# Patient Record
Sex: MALE | Race: WHITE | NOT HISPANIC OR LATINO | Employment: OTHER
[De-identification: names, ages, dates, MRNs, and addresses within clinical notes are randomized per-mention and may not be internally consistent; named-entity substitution may affect disease eponyms.]

---

## 2017-01-06 ENCOUNTER — HOSPITAL (OUTPATIENT)
Dept: OTHER | Age: 82
End: 2017-01-06
Attending: INTERNAL MEDICINE

## 2017-01-06 LAB — INR BLDC: 2.5

## 2017-02-01 ENCOUNTER — HOSPITAL (OUTPATIENT)
Dept: OTHER | Age: 82
End: 2017-02-01
Attending: INTERNAL MEDICINE

## 2017-02-03 LAB — INR BLDC: 2.3

## 2017-03-01 ENCOUNTER — HOSPITAL (OUTPATIENT)
Dept: OTHER | Age: 82
End: 2017-03-01
Attending: INTERNAL MEDICINE

## 2017-03-06 LAB — INR BLDC: 2.1

## 2017-03-20 LAB — INR BLDC: 2.6

## 2017-04-11 ENCOUNTER — HOSPITAL (OUTPATIENT)
Dept: OTHER | Age: 82
End: 2017-04-11
Attending: INTERNAL MEDICINE

## 2017-04-11 LAB — INR BLDC: 2.3

## 2017-04-29 LAB — INR BLDC: 1.6

## 2017-05-01 ENCOUNTER — HOSPITAL (OUTPATIENT)
Dept: OTHER | Age: 82
End: 2017-05-01
Attending: INTERNAL MEDICINE

## 2017-05-15 LAB — INR BLDC: 1.9

## 2017-05-27 ENCOUNTER — LAB ENCOUNTER (OUTPATIENT)
Dept: LAB | Age: 82
End: 2017-05-27
Attending: INTERNAL MEDICINE
Payer: MEDICARE

## 2017-05-27 DIAGNOSIS — E78.2 MIXED HYPERLIPIDEMIA: Primary | ICD-10-CM

## 2017-05-27 DIAGNOSIS — E11.42 DM TYPE 2 WITH DIABETIC PERIPHERAL NEUROPATHY (HCC): ICD-10-CM

## 2017-05-27 LAB — INR BLDC: 1.9

## 2017-05-27 PROCEDURE — 82043 UR ALBUMIN QUANTITATIVE: CPT | Performed by: INTERNAL MEDICINE

## 2017-05-27 PROCEDURE — 81003 URINALYSIS AUTO W/O SCOPE: CPT | Performed by: INTERNAL MEDICINE

## 2017-05-27 PROCEDURE — 82570 ASSAY OF URINE CREATININE: CPT | Performed by: INTERNAL MEDICINE

## 2017-05-27 PROCEDURE — 85025 COMPLETE CBC W/AUTO DIFF WBC: CPT

## 2017-06-01 ENCOUNTER — HOSPITAL (OUTPATIENT)
Dept: OTHER | Age: 82
End: 2017-06-01
Attending: INTERNAL MEDICINE

## 2017-06-08 PROBLEM — E53.8 VITAMIN B12 DEFICIENCY: Status: ACTIVE | Noted: 2017-06-08

## 2017-06-08 PROBLEM — E78.2 MIXED HYPERLIPIDEMIA: Status: ACTIVE | Noted: 2017-06-08

## 2017-06-08 PROBLEM — E11.42 PERIPHERAL SENSORY NEUROPATHY DUE TO TYPE 2 DIABETES MELLITUS (HCC): Status: ACTIVE | Noted: 2017-06-08

## 2017-06-08 PROBLEM — I10 ESSENTIAL HYPERTENSION: Status: ACTIVE | Noted: 2017-06-08

## 2017-06-13 LAB — INR BLDC: 3.5

## 2017-06-26 LAB — INR BLDC: 3.3

## 2017-07-10 ENCOUNTER — HOSPITAL (OUTPATIENT)
Dept: OTHER | Age: 82
End: 2017-07-10
Attending: INTERNAL MEDICINE

## 2017-07-10 LAB — INR BLDC: 2.2

## 2017-08-01 ENCOUNTER — HOSPITAL (OUTPATIENT)
Dept: OTHER | Age: 82
End: 2017-08-01
Attending: INTERNAL MEDICINE

## 2017-08-01 LAB — INR BLDC: 1.8

## 2017-08-15 LAB — INR BLDC: 2.3

## 2017-09-01 ENCOUNTER — HOSPITAL (OUTPATIENT)
Dept: OTHER | Age: 82
End: 2017-09-01
Attending: INTERNAL MEDICINE

## 2017-09-05 LAB — INR BLDC: 1.8

## 2017-09-18 LAB — INR BLDC: 1.8

## 2017-10-02 ENCOUNTER — HOSPITAL (OUTPATIENT)
Dept: OTHER | Age: 82
End: 2017-10-02
Attending: INTERNAL MEDICINE

## 2017-10-02 LAB — INR BLDC: 2

## 2017-10-30 LAB — INR BLDC: 1.9

## 2017-11-01 ENCOUNTER — HOSPITAL (OUTPATIENT)
Dept: OTHER | Age: 82
End: 2017-11-01
Attending: INTERNAL MEDICINE

## 2017-11-15 PROBLEM — Z79.01 MONITORING FOR LONG-TERM ANTICOAGULANT USE: Status: ACTIVE | Noted: 2017-11-15

## 2017-11-15 PROBLEM — Z51.81 MONITORING FOR LONG-TERM ANTICOAGULANT USE: Status: ACTIVE | Noted: 2017-11-15

## 2017-11-15 LAB — INR BLDC: 2.2

## 2017-12-01 ENCOUNTER — HOSPITAL (OUTPATIENT)
Dept: OTHER | Age: 82
End: 2017-12-01
Attending: INTERNAL MEDICINE

## 2017-12-06 LAB — INR BLDC: 2.6

## 2017-12-11 PROCEDURE — 82607 VITAMIN B-12: CPT | Performed by: INTERNAL MEDICINE

## 2018-01-04 ENCOUNTER — HOSPITAL (OUTPATIENT)
Dept: OTHER | Age: 83
End: 2018-01-04
Attending: INTERNAL MEDICINE

## 2018-01-04 LAB — INR BLDC: 2.6

## 2018-01-31 LAB — INR BLDC: 2.8

## 2018-02-01 ENCOUNTER — HOSPITAL (OUTPATIENT)
Dept: OTHER | Age: 83
End: 2018-02-01
Attending: INTERNAL MEDICINE

## 2018-02-28 LAB — INR BLDC: 2.8

## 2018-03-22 ENCOUNTER — HOSPITAL (OUTPATIENT)
Dept: OTHER | Age: 83
End: 2018-03-22
Attending: INTERNAL MEDICINE

## 2018-03-22 LAB — INR BLDC: 2.6

## 2018-04-01 ENCOUNTER — HOSPITAL (OUTPATIENT)
Dept: OTHER | Age: 83
End: 2018-04-01
Attending: INTERNAL MEDICINE

## 2018-04-19 LAB — INR BLDC: 2.6

## 2018-05-01 ENCOUNTER — HOSPITAL (OUTPATIENT)
Dept: OTHER | Age: 83
End: 2018-05-01
Attending: INTERNAL MEDICINE

## 2018-05-29 LAB — INR BLDC: 3

## 2018-06-19 ENCOUNTER — HOSPITAL (OUTPATIENT)
Dept: OTHER | Age: 83
End: 2018-06-19
Attending: INTERNAL MEDICINE

## 2018-06-19 LAB — INR BLDC: 2.8

## 2018-07-01 ENCOUNTER — HOSPITAL (OUTPATIENT)
Dept: OTHER | Age: 83
End: 2018-07-01
Attending: INTERNAL MEDICINE

## 2018-07-03 LAB — INR BLDC: 3.1

## 2018-07-05 PROBLEM — E11.22 CKD STAGE 3 DUE TO TYPE 2 DIABETES MELLITUS (HCC): Status: ACTIVE | Noted: 2018-07-05

## 2018-07-05 PROBLEM — N18.30 CKD STAGE 3 DUE TO TYPE 2 DIABETES MELLITUS (HCC): Status: ACTIVE | Noted: 2018-07-05

## 2018-07-05 PROBLEM — E55.9 VITAMIN D DEFICIENCY: Status: ACTIVE | Noted: 2018-07-05

## 2018-07-05 PROBLEM — E11.42 PERIPHERAL SENSORY NEUROPATHY DUE TO TYPE 2 DIABETES MELLITUS (HCC): Status: RESOLVED | Noted: 2017-06-08 | Resolved: 2018-07-05

## 2018-07-21 LAB — INR BLDC: 2.7

## 2018-09-25 ENCOUNTER — HOSPITAL (OUTPATIENT)
Dept: OTHER | Age: 83
End: 2018-09-25
Attending: INTERNAL MEDICINE

## 2018-09-25 LAB
INR BLDC: NORMAL
INR PPP: 3.4
PROTHROMBIN TIME: 32.3 SECONDS (ref 9.7–11.8)
PROTHROMBIN TIME: ABNORMAL

## 2018-12-28 PROCEDURE — 82570 ASSAY OF URINE CREATININE: CPT | Performed by: INTERNAL MEDICINE

## 2018-12-28 PROCEDURE — 82043 UR ALBUMIN QUANTITATIVE: CPT | Performed by: INTERNAL MEDICINE

## 2018-12-28 PROCEDURE — 81003 URINALYSIS AUTO W/O SCOPE: CPT | Performed by: INTERNAL MEDICINE

## 2019-01-07 PROBLEM — E78.00 PURE HYPERCHOLESTEROLEMIA: Status: ACTIVE | Noted: 2017-06-08

## 2020-01-13 PROBLEM — I73.9 PERIPHERAL VASCULAR DISEASE (HCC): Status: ACTIVE | Noted: 2020-01-13

## 2020-01-13 PROBLEM — F02.80 LATE ONSET ALZHEIMER'S DISEASE WITHOUT BEHAVIORAL DISTURBANCE (HCC): Status: ACTIVE | Noted: 2020-01-13

## 2020-01-13 PROBLEM — G30.1 LATE ONSET ALZHEIMER'S DISEASE WITHOUT BEHAVIORAL DISTURBANCE (HCC): Status: ACTIVE | Noted: 2020-01-13

## 2021-03-09 PROBLEM — E11.22 CKD STAGE 3 DUE TO TYPE 2 DIABETES MELLITUS (HCC): Status: ACTIVE | Noted: 2021-03-09

## 2021-03-09 PROBLEM — N18.30 CKD STAGE 3 DUE TO TYPE 2 DIABETES MELLITUS (HCC): Status: ACTIVE | Noted: 2021-03-09

## 2021-09-30 PROBLEM — E11.22 CKD STAGE 3 DUE TO TYPE 2 DIABETES MELLITUS (HCC): Status: RESOLVED | Noted: 2021-03-09 | Resolved: 2021-09-30

## 2021-09-30 PROBLEM — N18.30 CKD STAGE 3 DUE TO TYPE 2 DIABETES MELLITUS (HCC): Status: RESOLVED | Noted: 2021-03-09 | Resolved: 2021-09-30

## 2022-07-18 ENCOUNTER — APPOINTMENT (OUTPATIENT)
Dept: GENERAL RADIOLOGY | Facility: HOSPITAL | Age: 87
End: 2022-07-18
Attending: EMERGENCY MEDICINE
Payer: MEDICARE

## 2022-07-18 ENCOUNTER — HOSPITAL ENCOUNTER (EMERGENCY)
Facility: HOSPITAL | Age: 87
Discharge: HOME | End: 2022-07-18
Attending: EMERGENCY MEDICINE
Payer: MEDICARE

## 2022-07-18 ENCOUNTER — APPOINTMENT (OUTPATIENT)
Dept: CT IMAGING | Facility: HOSPITAL | Age: 87
End: 2022-07-18
Attending: EMERGENCY MEDICINE
Payer: MEDICARE

## 2022-07-18 VITALS
BODY MASS INDEX: 20.32 KG/M2 | TEMPERATURE: 99 F | RESPIRATION RATE: 18 BRPM | HEART RATE: 106 BPM | WEIGHT: 150 LBS | OXYGEN SATURATION: 91 % | HEIGHT: 72 IN | DIASTOLIC BLOOD PRESSURE: 72 MMHG | SYSTOLIC BLOOD PRESSURE: 150 MMHG

## 2022-07-18 DIAGNOSIS — S00.03XA CONTUSION OF SCALP, INITIAL ENCOUNTER: ICD-10-CM

## 2022-07-18 DIAGNOSIS — S80.02XA CONTUSION OF LEFT KNEE, INITIAL ENCOUNTER: ICD-10-CM
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
## 2022-07-18 LAB
ANION GAP SERPL CALC-SCNC: 6 MMOL/L (ref 0–18)
BASOPHILS # BLD AUTO: 0.02 X10(3) UL (ref 0–0.2)
BASOPHILS NFR BLD AUTO: 0.3 %
BILIRUB UR QL: NEGATIVE
BUN BLD-MCNC: 17 MG/DL (ref 7–18)
BUN/CREAT SERPL: 12.3 (ref 10–20)
CALCIUM BLD-MCNC: 8.6 MG/DL (ref 8.5–10.1)
CHLORIDE SERPL-SCNC: 100 MMOL/L (ref 98–112)
CLARITY UR: CLEAR
CO2 SERPL-SCNC: 32 MMOL/L (ref 21–32)
COLOR UR: YELLOW
CREAT BLD-MCNC: 1.38 MG/DL
DEPRECATED RDW RBC AUTO: 48.5 FL (ref 35.1–46.3)
EOSINOPHIL # BLD AUTO: 0 X10(3) UL (ref 0–0.7)
EOSINOPHIL NFR BLD AUTO: 0 %
ERYTHROCYTE [DISTWIDTH] IN BLOOD BY AUTOMATED COUNT: 13.5 % (ref 11–15)
GLUCOSE BLD-MCNC: 91 MG/DL (ref 70–99)
GLUCOSE UR-MCNC: NEGATIVE MG/DL
HCT VFR BLD AUTO: 48.7 %
HGB BLD-MCNC: 15.9 G/DL
HYALINE CASTS #/AREA URNS AUTO: PRESENT /LPF
IMM GRANULOCYTES # BLD AUTO: 0.03 X10(3) UL (ref 0–1)
IMM GRANULOCYTES NFR BLD: 0.4 %
INR BLD: 1.9 (ref 0.8–1.2)
LEUKOCYTE ESTERASE UR QL STRIP.AUTO: NEGATIVE
LYMPHOCYTES # BLD AUTO: 0.57 X10(3) UL (ref 1–4)
LYMPHOCYTES NFR BLD AUTO: 8.4 %
MCH RBC QN AUTO: 31.5 PG (ref 26–34)
MCHC RBC AUTO-ENTMCNC: 32.6 G/DL (ref 31–37)
MCV RBC AUTO: 96.6 FL
MONOCYTES # BLD AUTO: 1.3 X10(3) UL (ref 0.1–1)
MONOCYTES NFR BLD AUTO: 19.3 %
NEUTROPHILS # BLD AUTO: 4.83 X10 (3) UL (ref 1.5–7.7)
NEUTROPHILS # BLD AUTO: 4.83 X10(3) UL (ref 1.5–7.7)
NEUTROPHILS NFR BLD AUTO: 71.6 %
NITRITE UR QL STRIP.AUTO: NEGATIVE
OSMOLALITY SERPL CALC.SUM OF ELEC: 287 MOSM/KG (ref 275–295)
PH UR: 6 [PH] (ref 5–8)
PLATELET # BLD AUTO: 132 10(3)UL (ref 150–450)
POTASSIUM SERPL-SCNC: 3.9 MMOL/L (ref 3.5–5.1)
PROTHROMBIN TIME: 22 SECONDS (ref 11.6–14.8)
RBC # BLD AUTO: 5.04 X10(6)UL
SODIUM SERPL-SCNC: 138 MMOL/L (ref 136–145)
SP GR UR STRIP: 1.02 (ref 1–1.03)
UROBILINOGEN UR STRIP-ACNC: 1
WBC # BLD AUTO: 6.8 X10(3) UL (ref 4–11)

## 2022-07-18 PROCEDURE — 99284 EMERGENCY DEPT VISIT MOD MDM: CPT

## 2022-07-18 PROCEDURE — 72170 X-RAY EXAM OF PELVIS: CPT | Performed by: EMERGENCY MEDICINE

## 2022-07-18 PROCEDURE — 81015 MICROSCOPIC EXAM OF URINE: CPT | Performed by: EMERGENCY MEDICINE

## 2022-07-18 PROCEDURE — 85025 COMPLETE CBC W/AUTO DIFF WBC: CPT | Performed by: EMERGENCY MEDICINE

## 2022-07-18 PROCEDURE — 80048 BASIC METABOLIC PNL TOTAL CA: CPT | Performed by: EMERGENCY MEDICINE

## 2022-07-18 PROCEDURE — 85610 PROTHROMBIN TIME: CPT | Performed by: EMERGENCY MEDICINE

## 2022-07-18 PROCEDURE — 70450 CT HEAD/BRAIN W/O DYE: CPT | Performed by: EMERGENCY MEDICINE

## 2022-07-18 PROCEDURE — 81001 URINALYSIS AUTO W/SCOPE: CPT | Performed by: EMERGENCY MEDICINE

## 2022-07-18 PROCEDURE — 36415 COLL VENOUS BLD VENIPUNCTURE: CPT

## 2022-07-18 PROCEDURE — 71045 X-RAY EXAM CHEST 1 VIEW: CPT | Performed by: EMERGENCY MEDICINE

## 2022-08-16 ENCOUNTER — APPOINTMENT (OUTPATIENT)
Dept: GENERAL RADIOLOGY | Facility: HOSPITAL | Age: 87
End: 2022-08-16
Attending: EMERGENCY MEDICINE
Payer: MEDICARE

## 2022-08-16 ENCOUNTER — HOSPITAL ENCOUNTER (OUTPATIENT)
Facility: HOSPITAL | Age: 87
Setting detail: OBSERVATION
Discharge: NURSING, REHAB OR LONG-TERM CARE | End: 2022-08-19
Attending: EMERGENCY MEDICINE | Admitting: HOSPITALIST
Payer: MEDICARE

## 2022-08-16 ENCOUNTER — APPOINTMENT (OUTPATIENT)
Dept: CT IMAGING | Facility: HOSPITAL | Age: 87
End: 2022-08-16
Attending: EMERGENCY MEDICINE
Payer: MEDICARE

## 2022-08-16 DIAGNOSIS — S80.212A ABRASION OF KNEE, BILATERAL: ICD-10-CM

## 2022-08-16 DIAGNOSIS — R29.6 RECURRENT FALLS: Primary | ICD-10-CM

## 2022-08-16 DIAGNOSIS — S80.211A ABRASION OF KNEE, BILATERAL: ICD-10-CM

## 2022-08-16 DIAGNOSIS — R26.2 INABILITY TO AMBULATE DUE TO MULTIPLE JOINTS: ICD-10-CM

## 2022-08-16 DIAGNOSIS — N30.00 ACUTE CYSTITIS WITHOUT HEMATURIA: ICD-10-CM

## 2022-08-16 LAB
ANION GAP SERPL CALC-SCNC: 5 MMOL/L (ref 0–18)
BASE EXCESS BLD CALC-SCNC: 4.9 MMOL/L (ref ?–2)
BASOPHILS # BLD AUTO: 0.02 X10(3) UL (ref 0–0.2)
BASOPHILS NFR BLD AUTO: 0.2 %
BILIRUB UR QL: NEGATIVE
BUN BLD-MCNC: 16 MG/DL (ref 7–18)
BUN/CREAT SERPL: 12.4 (ref 10–20)
CALCIUM BLD-MCNC: 8.5 MG/DL (ref 8.5–10.1)
CHLORIDE SERPL-SCNC: 104 MMOL/L (ref 98–112)
CK SERPL-CCNC: 468 U/L
CLARITY UR: CLEAR
CO2 SERPL-SCNC: 30 MMOL/L (ref 21–32)
COLOR UR: YELLOW
CREAT BLD-MCNC: 1.29 MG/DL
DEPRECATED RDW RBC AUTO: 46.5 FL (ref 35.1–46.3)
EOSINOPHIL # BLD AUTO: 0 X10(3) UL (ref 0–0.7)
EOSINOPHIL NFR BLD AUTO: 0 %
ERYTHROCYTE [DISTWIDTH] IN BLOOD BY AUTOMATED COUNT: 13.7 % (ref 11–15)
EST. AVERAGE GLUCOSE BLD GHB EST-MCNC: 131 MG/DL (ref 68–126)
GFR SERPLBLD BASED ON 1.73 SQ M-ARVRAT: 53 ML/MIN/1.73M2 (ref 60–?)
GLUCOSE BLD-MCNC: 150 MG/DL (ref 70–99)
GLUCOSE BLDC GLUCOMTR-MCNC: 113 MG/DL (ref 70–99)
GLUCOSE BLDC GLUCOMTR-MCNC: 115 MG/DL (ref 70–99)
GLUCOSE UR-MCNC: NEGATIVE MG/DL
HBA1C MFR BLD: 6.2 % (ref ?–5.7)
HCO3 BLDA-SCNC: 28.7 MEQ/L (ref 21–27)
HCT VFR BLD AUTO: 42.4 %
HGB BLD-MCNC: 14.3 G/DL
IMM GRANULOCYTES # BLD AUTO: 0.07 X10(3) UL (ref 0–1)
IMM GRANULOCYTES NFR BLD: 0.6 %
INR BLD: 1.14 (ref 0.85–1.16)
LYMPHOCYTES # BLD AUTO: 0.55 X10(3) UL (ref 1–4)
LYMPHOCYTES NFR BLD AUTO: 4.9 %
MCH RBC QN AUTO: 31.8 PG (ref 26–34)
MCHC RBC AUTO-ENTMCNC: 33.7 G/DL (ref 31–37)
MCV RBC AUTO: 94.2 FL
MODIFIED ALLEN TEST: POSITIVE
MONOCYTES # BLD AUTO: 0.84 X10(3) UL (ref 0.1–1)
MONOCYTES NFR BLD AUTO: 7.4 %
NEUTROPHILS # BLD AUTO: 9.85 X10 (3) UL (ref 1.5–7.7)
NEUTROPHILS # BLD AUTO: 9.85 X10(3) UL (ref 1.5–7.7)
NEUTROPHILS NFR BLD AUTO: 86.9 %
NITRITE UR QL STRIP.AUTO: NEGATIVE
NT-PROBNP SERPL-MCNC: 3861 PG/ML (ref ?–450)
O2 CT BLD-SCNC: 20.2 VOL% (ref 15–23)
OSMOLALITY SERPL CALC.SUM OF ELEC: 292 MOSM/KG (ref 275–295)
OXYGEN LITERS/MINUTE: 15 L/MIN
PCO2 BLDA: 36 MM HG (ref 35–45)
PH BLDA: 7.5 [PH] (ref 7.35–7.45)
PH UR: 5.5 [PH] (ref 5–8)
PLATELET # BLD AUTO: 164 10(3)UL (ref 150–450)
PO2 BLDA: 391 MM HG (ref 80–100)
POTASSIUM SERPL-SCNC: 4.4 MMOL/L (ref 3.5–5.1)
PROCALCITONIN SERPL-MCNC: 0.05 NG/ML (ref ?–0.16)
PROTHROMBIN TIME: 14.5 SECONDS (ref 11.6–14.8)
PUNCTURE CHARGE: YES
RBC # BLD AUTO: 4.5 X10(6)UL
SAO2 % BLDA: >99 % (ref 94–100)
SARS-COV-2 RNA RESP QL NAA+PROBE: NOT DETECTED
SODIUM SERPL-SCNC: 139 MMOL/L (ref 136–145)
SP GR UR STRIP: 1.02 (ref 1–1.03)
TROPONIN I HIGH SENSITIVITY: 50 NG/L
UROBILINOGEN UR STRIP-ACNC: 0.2
WBC # BLD AUTO: 11.3 X10(3) UL (ref 4–11)

## 2022-08-16 PROCEDURE — 73560 X-RAY EXAM OF KNEE 1 OR 2: CPT | Performed by: EMERGENCY MEDICINE

## 2022-08-16 PROCEDURE — 72170 X-RAY EXAM OF PELVIS: CPT | Performed by: EMERGENCY MEDICINE

## 2022-08-16 PROCEDURE — 70450 CT HEAD/BRAIN W/O DYE: CPT | Performed by: EMERGENCY MEDICINE

## 2022-08-16 PROCEDURE — 71045 X-RAY EXAM CHEST 1 VIEW: CPT | Performed by: EMERGENCY MEDICINE

## 2022-08-16 PROCEDURE — 70486 CT MAXILLOFACIAL W/O DYE: CPT | Performed by: EMERGENCY MEDICINE

## 2022-08-16 PROCEDURE — 72125 CT NECK SPINE W/O DYE: CPT | Performed by: EMERGENCY MEDICINE

## 2022-08-16 RX ORDER — MEMANTINE HYDROCHLORIDE 10 MG/1
10 TABLET ORAL 2 TIMES DAILY
Status: DISCONTINUED | OUTPATIENT
Start: 2022-08-16 | End: 2022-08-19

## 2022-08-16 RX ORDER — SENNOSIDES 8.6 MG
17.2 TABLET ORAL NIGHTLY PRN
Status: DISCONTINUED | OUTPATIENT
Start: 2022-08-16 | End: 2022-08-19

## 2022-08-16 RX ORDER — HEPARIN SODIUM 5000 [USP'U]/ML
5000 INJECTION, SOLUTION INTRAVENOUS; SUBCUTANEOUS EVERY 8 HOURS SCHEDULED
Status: DISCONTINUED | OUTPATIENT
Start: 2022-08-16 | End: 2022-08-19

## 2022-08-16 RX ORDER — DONEPEZIL HYDROCHLORIDE 10 MG/1
10 TABLET, FILM COATED ORAL EVERY EVENING
Status: DISCONTINUED | OUTPATIENT
Start: 2022-08-16 | End: 2022-08-19

## 2022-08-16 RX ORDER — BISACODYL 10 MG
10 SUPPOSITORY, RECTAL RECTAL
Status: DISCONTINUED | OUTPATIENT
Start: 2022-08-16 | End: 2022-08-19

## 2022-08-16 RX ORDER — ACETAMINOPHEN 500 MG
500 TABLET ORAL EVERY 4 HOURS PRN
Status: DISCONTINUED | OUTPATIENT
Start: 2022-08-16 | End: 2022-08-19

## 2022-08-16 RX ORDER — SODIUM CHLORIDE 9 MG/ML
INJECTION, SOLUTION INTRAVENOUS CONTINUOUS
Status: ACTIVE | OUTPATIENT
Start: 2022-08-16 | End: 2022-08-17

## 2022-08-16 RX ORDER — ONDANSETRON 2 MG/ML
4 INJECTION INTRAMUSCULAR; INTRAVENOUS EVERY 6 HOURS PRN
Status: DISCONTINUED | OUTPATIENT
Start: 2022-08-16 | End: 2022-08-19

## 2022-08-16 RX ORDER — POLYETHYLENE GLYCOL 3350 17 G/17G
17 POWDER, FOR SOLUTION ORAL DAILY PRN
Status: DISCONTINUED | OUTPATIENT
Start: 2022-08-16 | End: 2022-08-19

## 2022-08-16 RX ORDER — METOCLOPRAMIDE HYDROCHLORIDE 5 MG/ML
10 INJECTION INTRAMUSCULAR; INTRAVENOUS EVERY 8 HOURS PRN
Status: DISCONTINUED | OUTPATIENT
Start: 2022-08-16 | End: 2022-08-19

## 2022-08-16 NOTE — ED INITIAL ASSESSMENT (HPI)
Pt arrives via EMS s/p fall at home. Pt arrives in C-collar on back board. Per pt's daughter pt stopped blood thinners 2 weeks ago, not sure why.  Noted left eye swelling

## 2022-08-16 NOTE — PROGRESS NOTES
Patient admitted alert, oriented to self/situation. Per daughter had a fall at home, patient does not remember the fall. Vitals stable, denies pain/discomfort. Med rec. Done with daughter at bedside. Skin assessment per protocol. Incontinent care provided. Fall precautions in place, sitting up in chair, call light within reach.

## 2022-08-16 NOTE — CM/SW NOTE
Received MDO for discharge planning. Chart review indicated patient has dementia and lives alone. Patient admitted with a fall, PT/OT evaluations pending. Attempted to reach patient's daughter Kenisha Rollins but no answer. LUISA/MIMI to f/u tomorrow.     Stephanie Farnsworth, [address]

## 2022-08-16 NOTE — ED QUICK NOTES
Orders for admission, patient is aware of plan and ready to go upstairs. Any questions, please call ED RN Le at extension [phone].      Patient Covid vaccination status: Unvaccinated     COVID Test Ordered in ED: Rapid SARS-CoV-2 by PCR    COVID Suspicion at Admission: N/A    Running Infusions:      Mental Status/LOC at time of transport: A&Ox2-3    Other pertinent information: Incontinent, bedbound, lives at home alone, daughter lives 4 mins away    CIWA score: N/A   NIH score:  N/A

## 2022-08-17 PROBLEM — Z71.89 GOALS OF CARE, COUNSELING/DISCUSSION: Status: ACTIVE | Noted: 2022-08-17

## 2022-08-17 PROBLEM — Z71.89 ADVANCED CARE PLANNING/COUNSELING DISCUSSION: Status: ACTIVE | Noted: 2022-08-17

## 2022-08-17 LAB
ANION GAP SERPL CALC-SCNC: 4 MMOL/L (ref 0–18)
BASOPHILS # BLD AUTO: 0.02 X10(3) UL (ref 0–0.2)
BASOPHILS NFR BLD AUTO: 0.2 %
BUN BLD-MCNC: 18 MG/DL (ref 7–18)
BUN/CREAT SERPL: 18 (ref 10–20)
CALCIUM BLD-MCNC: 8.4 MG/DL (ref 8.5–10.1)
CHLORIDE SERPL-SCNC: 109 MMOL/L (ref 98–112)
CO2 SERPL-SCNC: 28 MMOL/L (ref 21–32)
CREAT BLD-MCNC: 1 MG/DL
DEPRECATED RDW RBC AUTO: 47.7 FL (ref 35.1–46.3)
EOSINOPHIL # BLD AUTO: 0.06 X10(3) UL (ref 0–0.7)
EOSINOPHIL NFR BLD AUTO: 0.7 %
ERYTHROCYTE [DISTWIDTH] IN BLOOD BY AUTOMATED COUNT: 14 % (ref 11–15)
GFR SERPLBLD BASED ON 1.73 SQ M-ARVRAT: 71 ML/MIN/1.73M2 (ref 60–?)
GLUCOSE BLD-MCNC: 98 MG/DL (ref 70–99)
GLUCOSE BLDC GLUCOMTR-MCNC: 106 MG/DL (ref 70–99)
GLUCOSE BLDC GLUCOMTR-MCNC: 107 MG/DL (ref 70–99)
GLUCOSE BLDC GLUCOMTR-MCNC: 115 MG/DL (ref 70–99)
GLUCOSE BLDC GLUCOMTR-MCNC: 93 MG/DL (ref 70–99)
HCT VFR BLD AUTO: 39.5 %
HGB BLD-MCNC: 13.3 G/DL
IMM GRANULOCYTES # BLD AUTO: 0.03 X10(3) UL (ref 0–1)
IMM GRANULOCYTES NFR BLD: 0.3 %
LYMPHOCYTES # BLD AUTO: 0.93 X10(3) UL (ref 1–4)
LYMPHOCYTES NFR BLD AUTO: 10.7 %
MCH RBC QN AUTO: 32 PG (ref 26–34)
MCHC RBC AUTO-ENTMCNC: 33.7 G/DL (ref 31–37)
MCV RBC AUTO: 95 FL
MONOCYTES # BLD AUTO: 1.02 X10(3) UL (ref 0.1–1)
MONOCYTES NFR BLD AUTO: 11.7 %
NEUTROPHILS # BLD AUTO: 6.65 X10 (3) UL (ref 1.5–7.7)
NEUTROPHILS # BLD AUTO: 6.65 X10(3) UL (ref 1.5–7.7)
NEUTROPHILS NFR BLD AUTO: 76.4 %
OSMOLALITY SERPL CALC.SUM OF ELEC: 294 MOSM/KG (ref 275–295)
PLATELET # BLD AUTO: 143 10(3)UL (ref 150–450)
POTASSIUM SERPL-SCNC: 4.2 MMOL/L (ref 3.5–5.1)
RBC # BLD AUTO: 4.16 X10(6)UL
SODIUM SERPL-SCNC: 141 MMOL/L (ref 136–145)
WBC # BLD AUTO: 8.7 X10(3) UL (ref 4–11)

## 2022-08-17 PROCEDURE — 99204 OFFICE O/P NEW MOD 45 MIN: CPT | Performed by: NURSE PRACTITIONER

## 2022-08-17 RX ORDER — NITROFURANTOIN 25; 75 MG/1; MG/1
100 CAPSULE ORAL 2 TIMES DAILY
Qty: 10 CAPSULE | Refills: 0 | Status: SHIPPED | OUTPATIENT
Start: 2022-08-17 | End: 2022-08-22

## 2022-08-17 RX ORDER — NITROFURANTOIN 25; 75 MG/1; MG/1
100 CAPSULE ORAL 2 TIMES DAILY
Status: DISCONTINUED | OUTPATIENT
Start: 2022-08-17 | End: 2022-08-19

## 2022-08-17 RX ORDER — NITROFURANTOIN 25; 75 MG/1; MG/1
100 CAPSULE ORAL 2 TIMES DAILY
Status: DISCONTINUED | OUTPATIENT
Start: 2022-08-17 | End: 2022-08-17

## 2022-08-17 NOTE — PLAN OF CARE
Pt. Alert, oriented, vitals stable. Denies pain/discomfort. No s/s of hypo/hyperglycemia noted. Tolerated scheduled medications. Sitting up in chair for meals. Incontinent care provided. Assisted with adls. Fall precautions in place. Call light within reach. Frequent rounding. Problem: Patient Centered Care  Goal: Patient preferences are identified and integrated in the patient's plan of care  Description: Interventions:  - What would you like us to know as we care for you?  I live at home alone  - Provide timely, complete, and accurate information to patient/family  - Incorporate patient and family knowledge, values, beliefs, and cultural backgrounds into the planning and delivery of care  - Encourage patient/family to participate in care and decision-making at the level they choose  - Honor patient and family perspectives and choices  8/17/2022 1036 by Black Floyd RN  Outcome: Progressing  8/17/2022 1034 by Black Floyd RN  Outcome: Progressing     Problem: Diabetes/Glucose Control  Goal: Glucose maintained within prescribed range  Description: INTERVENTIONS:  - Monitor Blood Glucose as ordered  - Assess for signs and symptoms of hyperglycemia and hypoglycemia  - Administer ordered medications to maintain glucose within target range  - Assess barriers to adequate nutritional intake and initiate nutrition consult as needed  - Instruct patient on self management of diabetes  8/17/2022 1036 by Raphael Nyhan, RN  Outcome: Progressing  8/17/2022 1034 by Raphael Nyhan, RN  Outcome: Progressing     Problem: Patient/Family Goals  Goal: Patient/Family Long Term Goal  Description: Patient's Long Term Goal: Be healthy    Interventions:  - Monitor vital signs  - Monitor appropriate labs  - Pain management as needed  - Administer medications per order  - Follow MD orders  - Diagnostics per order  - Fall precautions  - Activity as tolerated  - Assist with activities of daily living as needed  - Update / inform patient and family on plan of care  - Discharge planning   - See additional Care Plan goals for specific interventions  8/17/2022 1036 by Donice Lanes, RN  Outcome: Progressing  8/17/2022 1034 by Donice Lanes, RN  Outcome: Progressing  Goal: Patient/Family Short Term Goal  Description: Patient's Short Term Goal: Go home    Interventions:   - Monitor vital signs  - Monitor appropriate labs  - Pain management as needed  - Administer medications per order  - Follow MD orders  - Diagnostics per order  - Fall precautions  - Activity as tolerated  - Assist with activities of daily living as needed  - Update / inform patient and family on plan of care   - See additional Care Plan goals for specific interventions  8/17/2022 1036 by Black aLm RN  Outcome: Progressing  8/17/2022 1034 by Donice Lanes, RN  Outcome: Progressing     Problem: PAIN - ADULT  Goal: Verbalizes/displays adequate comfort level or patient's stated pain goal  Description: INTERVENTIONS:  - Encourage pt to monitor pain and request assistance  - Assess pain using appropriate pain scale  - Administer analgesics based on type and severity of pain and evaluate response  - Implement non-pharmacological measures as appropriate and evaluate response  - Consider cultural and social influences on pain and pain management  - Manage/alleviate anxiety  - Utilize distraction and/or relaxation techniques  - Monitor for opioid side effects  - Notify MD/LIP if interventions unsuccessful or patient reports new pain  - Anticipate increased pain with activity and pre-medicate as appropriate  Outcome: Progressing     Problem: RISK FOR INFECTION - ADULT  Goal: Absence of fever/infection during anticipated neutropenic period  Description: INTERVENTIONS  - Monitor WBC  - Administer growth factors as ordered  - Implement neutropenic guidelines  Outcome: Progressing     Problem: SAFETY ADULT - FALL  Goal: Free from fall injury  Description: INTERVENTIONS:  - Assess pt frequently for physical needs  - Identify cognitive and physical deficits and behaviors that affect risk of falls.   - [place] fall precautions as indicated by assessment.  - Educate pt/family on patient safety including physical limitations  - Instruct pt to call for assistance with activity based on assessment  - Modify environment to reduce risk of injury  - Provide assistive devices as appropriate  - Consider OT/PT consult to assist with strengthening/mobility  - Encourage toileting schedule  Outcome: Progressing     Problem: DISCHARGE PLANNING  Goal: Discharge to home or other facility with appropriate resources  Description: INTERVENTIONS:  - Identify barriers to discharge w/pt and caregiver  - Include patient/family/discharge partner in discharge planning  - Arrange for needed discharge resources and transportation as appropriate  - Identify discharge learning needs (meds, wound care, etc)  - Arrange for interpreters to assist at discharge as needed  - Consider post-discharge preferences of patient/family/discharge partner  - Complete POLST form as appropriate  - Assess patient's ability to be responsible for managing their own health  - Refer to Case Management Department for coordinating discharge planning if the patient needs post-hospital services based on physician/LIP order or complex needs related to functional status, cognitive ability or social support system  Outcome: Progressing

## 2022-08-17 NOTE — CM/SW NOTE
Addendum 11:26:  SW provided pt dtr with [facility] approved facility list.  Dtr/pt to review, and follow up with SW.    PT/OT recommendation: SNF  Referral uploaded via [employer]. PASRR uploaded, no level 2 required. Will need facility acceptance/pt choice. SW/CM to remain available for support and/or discharge planning.      NOAH Martin, [facility]  Social Work   YQB:#[id number]

## 2022-08-17 NOTE — PLAN OF CARE
Problem: Patient Centered Care  Goal: Patient preferences are identified and integrated in the patient's plan of care  Description: Interventions:  - What would you like us to know as we care for you?  I live at home alone  - Provide timely, complete, and accurate information to patient/family  - Incorporate patient and family knowledge, values, beliefs, and cultural backgrounds into the planning and delivery of care  - Encourage patient/family to participate in care and decision-making at the level they choose  - Honor patient and family perspectives and choices  Outcome: Progressing     Problem: Diabetes/Glucose Control  Goal: Glucose maintained within prescribed range  Description: INTERVENTIONS:  - Monitor Blood Glucose as ordered  - Assess for signs and symptoms of hyperglycemia and hypoglycemia  - Administer ordered medications to maintain glucose within target range  - Assess barriers to adequate nutritional intake and initiate nutrition consult as needed  - Instruct patient on self management of diabetes  Outcome: Progressing     Problem: Patient/Family Goals  Goal: Patient/Family Long Term Goal  Description: Patient's Long Term Goal: Be healthy    Interventions:  - Monitor vital signs  - Monitor appropriate labs  - Pain management as needed  - Administer medications per order  - Follow MD orders  - Diagnostics per order  - Fall precautions  - Activity as tolerated  - Assist with activities of daily living as needed  - Update / inform patient and family on plan of care  - Discharge planning   - See additional Care Plan goals for specific interventions  Outcome: Progressing  Goal: Patient/Family Short Term Goal  Description: Patient's Short Term Goal: Go home    Interventions:   - Monitor vital signs  - Monitor appropriate labs  - Pain management as needed  - Administer medications per order  - Follow MD orders  - Diagnostics per order  - Fall precautions  - Activity as tolerated  - Assist with activities of daily living as needed  - Update / inform patient and family on plan of care   - See additional Care Plan goals for specific interventions  Outcome: Progressing   Patient currently stable at this time. Vitals stable. Denies any pain or discomfort. Fall and safety precautions in place. Bed at lowest position, bed alarm in place. Call light and personal belongings within reach. Frequent nursing rounds completed.

## 2022-08-17 NOTE — CM/SW NOTE
Terry Saldana made to [facility] per pt dtr request.    SW/CM to remain available for support and/or discharge planning.      NOAH Seymour, [facility]  Social Work   JNZ:#[id number]

## 2022-08-18 LAB
GLUCOSE BLDC GLUCOMTR-MCNC: 103 MG/DL (ref 70–99)
GLUCOSE BLDC GLUCOMTR-MCNC: 121 MG/DL (ref 70–99)
GLUCOSE BLDC GLUCOMTR-MCNC: 209 MG/DL (ref 70–99)
GLUCOSE BLDC GLUCOMTR-MCNC: 80 MG/DL (ref 70–99)
SARS-COV-2 RNA RESP QL NAA+PROBE: DETECTED

## 2022-08-18 PROCEDURE — 99214 OFFICE O/P EST MOD 30 MIN: CPT | Performed by: NURSE PRACTITIONER

## 2022-08-18 RX ORDER — ZINC SULFATE 50(220)MG
220 CAPSULE ORAL DAILY
Status: DISCONTINUED | OUTPATIENT
Start: 2022-08-18 | End: 2022-08-19

## 2022-08-18 RX ORDER — ASCORBIC ACID 500 MG
1000 TABLET ORAL DAILY
Status: DISCONTINUED | OUTPATIENT
Start: 2022-08-18 | End: 2022-08-19

## 2022-08-18 RX ORDER — MELATONIN
2000 DAILY
Status: DISCONTINUED | OUTPATIENT
Start: 2022-08-18 | End: 2022-08-19

## 2022-08-18 RX ORDER — ZINC SULFATE 50(220)MG
220 CAPSULE ORAL DAILY
Refills: 0 | Status: SHIPPED | COMMUNITY
Start: 2022-08-18

## 2022-08-18 NOTE — PLAN OF CARE
Up in chair most of shift, [place].  +covid, no symptoms. HFR      Problem: Patient Centered Care  Goal: Patient preferences are identified and integrated in the patient's plan of care  Description: Interventions:  - What would you like us to know as we care for you?  I live at home alone  - Provide timely, complete, and accurate information to patient/family  - Incorporate patient and family knowledge, values, beliefs, and cultural backgrounds into the planning and delivery of care  - Encourage patient/family to participate in care and decision-making at the level they choose  - Honor patient and family perspectives and choices  Outcome: Progressing     Problem: Diabetes/Glucose Control  Goal: Glucose maintained within prescribed range  Description: INTERVENTIONS:  - Monitor Blood Glucose as ordered  - Assess for signs and symptoms of hyperglycemia and hypoglycemia  - Administer ordered medications to maintain glucose within target range  - Assess barriers to adequate nutritional intake and initiate nutrition consult as needed  - Instruct patient on self management of diabetes  Outcome: Progressing     Problem: Patient/Family Goals  Goal: Patient/Family Long Term Goal  Description: Patient's Long Term Goal: Be healthy    Interventions:  - Monitor vital signs  - Monitor appropriate labs  - Pain management as needed  - Administer medications per order  - Follow MD orders  - Diagnostics per order  - Fall precautions  - Activity as tolerated  - Assist with activities of daily living as needed  - Update / inform patient and family on plan of care  - Discharge planning   - See additional Care Plan goals for specific interventions  Outcome: Progressing  Goal: Patient/Family Short Term Goal  Description: Patient's Short Term Goal: Go home    Interventions:   - Monitor vital signs  - Monitor appropriate labs  - Pain management as needed  - Administer medications per order  - Follow MD orders  - Diagnostics per order  - Fall precautions  - Activity as tolerated  - Assist with activities of daily living as needed  - Update / inform patient and family on plan of care   - See additional Care Plan goals for specific interventions  Outcome: Progressing     Problem: PAIN - ADULT  Goal: Verbalizes/displays adequate comfort level or patient's stated pain goal  Description: INTERVENTIONS:  - Encourage pt to monitor pain and request assistance  - Assess pain using appropriate pain scale  - Administer analgesics based on type and severity of pain and evaluate response  - Implement non-pharmacological measures as appropriate and evaluate response  - Consider cultural and social influences on pain and pain management  - Manage/alleviate anxiety  - Utilize distraction and/or relaxation techniques  - Monitor for opioid side effects  - Notify MD/LIP if interventions unsuccessful or patient reports new pain  - Anticipate increased pain with activity and pre-medicate as appropriate  Outcome: Progressing  Note: No c/o pain     Problem: RISK FOR INFECTION - ADULT  Goal: Absence of fever/infection during anticipated neutropenic period  Description: INTERVENTIONS  - Monitor WBC  - Administer growth factors as ordered  - Implement neutropenic guidelines  Outcome: Progressing     Problem: SAFETY ADULT - FALL  Goal: Free from fall injury  Description: INTERVENTIONS:  - Assess pt frequently for physical needs  - Identify cognitive and physical deficits and behaviors that affect risk of falls.   - [place] fall precautions as indicated by assessment.  - Educate pt/family on patient safety including physical limitations  - Instruct pt to call for assistance with activity based on assessment  - Modify environment to reduce risk of injury  - Provide assistive devices as appropriate  - Consider OT/PT consult to assist with strengthening/mobility  - Encourage toileting schedule  Outcome: Progressing     Problem: DISCHARGE PLANNING  Goal: Discharge to home or other facility with appropriate resources  Description: INTERVENTIONS:  - Identify barriers to discharge w/pt and caregiver  - Include patient/family/discharge partner in discharge planning  - Arrange for needed discharge resources and transportation as appropriate  - Identify discharge learning needs (meds, wound care, etc)  - Arrange for interpreters to assist at discharge as needed  - Consider post-discharge preferences of patient/family/discharge partner  - Complete POLST form as appropriate  - Assess patient's ability to be responsible for managing their own health  - Refer to Case Management Department for coordinating discharge planning if the patient needs post-hospital services based on physician/LIP order or complex needs related to functional status, cognitive ability or social support system  Outcome: Progressing     Problem: MUSCULOSKELETAL - ADULT  Goal: Return mobility to safest level of function  Description: INTERVENTIONS:  - Assess patient stability and activity tolerance for standing, transferring and ambulating w/ or w/o assistive devices  - Assist with transfers and ambulation using safe patient handling equipment as needed  - Ensure adequate protection for wounds/incisions during mobilization  - Obtain PT/OT consults as needed  - Advance activity as appropriate  - Communicate ordered activity level and limitations with patient/family  Outcome: Not Progressing     Problem: Impaired Functional Mobility  Goal: Achieve highest/safest level of mobility/gait  Description: Interventions:  - Assess patient's functional ability and stability  - Promote increasing activity/tolerance for mobility and gait  - Educate and engage patient/family in tolerated activity level and precautions  - Recommend use of  RW for transfers and ambulation  Outcome: Not Progressing     Problem: Impaired Activities of Daily Living  Goal: Achieve highest/safest level of independence in self care  Description: Interventions:  - Assess ability and encourage patient to participate in ADLs to maximize function  - Promote sitting position while performing ADLs such as feeding, grooming, and bathing  - Educate and encourage patient/family in tolerated functional activity level and precautions during self-care  - Encourage patient to incorporate impaired side during daily activities to promote function  Outcome: Not Progressing

## 2022-08-18 NOTE — CM/SW NOTE
08/18/22 0911   Discharge disposition   Expected discharge disposition Skilled Nurs   [facility] Provider [facility]   Discharge transportation [facility]  (3pm per Mykail)   Sherren Score @ [facility] approved 3pm discharge. RN to inform family of discharge. RN to call report to BT/ELM at [phone].     NOAH Daly, [facility]  Social Work   PSL:#[id number]

## 2022-08-18 NOTE — CM/SW NOTE
Pt tested + for COVID, unable to discharge to [facility], [address] made to [place], (DTR agreeable). Pt   needs to be fever free for 72 hours. Possible Sunday discharge. Plan: DC to [place], pending fever free for 72 hours.     NOAH Young, [facility]  [employer] Work   YPO:#[id number]

## 2022-08-18 NOTE — PROGRESS NOTES
visited with pt. Introduced self, offering emotional/spiritual support. Pt verbalized not feeling so well. Pt welcomed prayer. Observed pt sitting in chair. Pt [place]. Pt appears to be comfortable. Provided active listening and prayer. Will remain available for support. 08/17/22 1385   Clinical Encounter Type   Visited With Patient   Routine Visit Introduction   Referral From Nurse   Referral To    Orthodox Encounters   Orth[facility] Needs Prayer   Taxonomy   Intended Effects Helping someone feel comforted   Methods Offer spiritual/[employer] support   Interventions Explain  role; Acknowledge current situation;[place]   Rev. Gauri Gonzalez, Pastoral Care Dept.   [id number], 24/7

## 2022-08-18 NOTE — CM/SW NOTE
Pt has discharge order, per [facility], pt will need the following verbiage notated    \"[facility] at [facility] due [facility] (insert dx[facility]"    SW informed pt care team.    SW/CM to remain available for support and/or discharge planning.      Zachary Lennox, MSW, [facility]  Social Work   VSD:#[id number]

## 2022-08-18 NOTE — PLAN OF CARE
Problem: Patient Centered Care  Goal: Patient preferences are identified and integrated in the patient's plan of care  Description: Interventions:  - What would you like us to know as we care for you?  I live at home alone  - Provide timely, complete, and accurate information to patient/family  - Incorporate patient and family knowledge, values, beliefs, and cultural backgrounds into the planning and delivery of care  - Encourage patient/family to participate in care and decision-making at the level they choose  - Honor patient and family perspectives and choices  Outcome: Progressing     Problem: Diabetes/Glucose Control  Goal: Glucose maintained within prescribed range  Description: INTERVENTIONS:  - Monitor Blood Glucose as ordered  - Assess for signs and symptoms of hyperglycemia and hypoglycemia  - Administer ordered medications to maintain glucose within target range  - Assess barriers to adequate nutritional intake and initiate nutrition consult as needed  - Instruct patient on self management of diabetes  Outcome: Progressing     Problem: Patient/Family Goals  Goal: Patient/Family Long Term Goal  Description: Patient's Long Term Goal: Be healthy    Interventions:  - Monitor vital signs  - Monitor appropriate labs  - Pain management as needed  - Administer medications per order  - Follow MD orders  - Diagnostics per order  - Fall precautions  - Activity as tolerated  - Assist with activities of daily living as needed  - Update / inform patient and family on plan of care  - Discharge planning   - See additional Care Plan goals for specific interventions  Outcome: Progressing  Goal: Patient/Family Short Term Goal  Description: Patient's Short Term Goal: Go home    Interventions:   - Monitor vital signs  - Monitor appropriate labs  - Pain management as needed  - Administer medications per order  - Follow MD orders  - Diagnostics per order  - Fall precautions  - Activity as tolerated  - Assist with activities of daily living as needed  - Update / inform patient and family on plan of care   - See additional Care Plan goals for specific interventions  Outcome: Progressing     Problem: PAIN - ADULT  Goal: Verbalizes/displays adequate comfort level or patient's stated pain goal  Description: INTERVENTIONS:  - Encourage pt to monitor pain and request assistance  - Assess pain using appropriate pain scale  - Administer analgesics based on type and severity of pain and evaluate response  - Implement non-pharmacological measures as appropriate and evaluate response  - Consider cultural and social influences on pain and pain management  - Manage/alleviate anxiety  - Utilize distraction and/or relaxation techniques  - Monitor for opioid side effects  - Notify MD/LIP if interventions unsuccessful or patient reports new pain  - Anticipate increased pain with activity and pre-medicate as appropriate  Outcome: Progressing     Problem: RISK FOR INFECTION - ADULT  Goal: Absence of fever/infection during anticipated neutropenic period  Description: INTERVENTIONS  - Monitor WBC  - Administer growth factors as ordered  - Implement neutropenic guidelines  Outcome: Progressing     Problem: SAFETY ADULT - FALL  Goal: Free from fall injury  Description: INTERVENTIONS:  - Assess pt frequently for physical needs  - Identify cognitive and physical deficits and behaviors that affect risk of falls.   - [place] fall precautions as indicated by assessment.  - Educate pt/family on patient safety including physical limitations  - Instruct pt to call for assistance with activity based on assessment  - Modify environment to reduce risk of injury  - Provide assistive devices as appropriate  - Consider OT/PT consult to assist with strengthening/mobility  - Encourage toileting schedule  Outcome: Progressing     Problem: DISCHARGE PLANNING  Goal: Discharge to home or other facility with appropriate resources  Description: INTERVENTIONS:  - Identify barriers to discharge w/pt and caregiver  - Include patient/family/discharge partner in discharge planning  - Arrange for needed discharge resources and transportation as appropriate  - Identify discharge learning needs (meds, wound care, etc)  - Arrange for interpreters to assist at discharge as needed  - Consider post-discharge preferences of patient/family/discharge partner  - Complete POLST form as appropriate  - Assess patient's ability to be responsible for managing their own health  - Refer to Case Management Department for coordinating discharge planning if the patient needs post-hospital services based on physician/LIP order or complex needs related to functional status, cognitive ability or social support system  Outcome: Progressing   Outcome: Progressing   Patient currently stable at this time. Vitals stable. Denies any pain or discomfort. Fall and safety precautions in place. Bed at lowest position, bed alarm in place. Call light and personal belongings within reach. Frequent nursing rounds completed.

## 2022-08-19 VITALS
HEART RATE: 74 BPM | HEIGHT: 74 IN | SYSTOLIC BLOOD PRESSURE: 133 MMHG | WEIGHT: 187.5 LBS | BODY MASS INDEX: 24.06 KG/M2 | DIASTOLIC BLOOD PRESSURE: 55 MMHG | TEMPERATURE: 98 F | OXYGEN SATURATION: 100 % | RESPIRATION RATE: 18 BRPM

## 2022-08-19 LAB
GLUCOSE BLDC GLUCOMTR-MCNC: 109 MG/DL (ref 70–99)
GLUCOSE BLDC GLUCOMTR-MCNC: 98 MG/DL (ref 70–99)

## 2022-08-19 NOTE — CM/SW NOTE
08/18/22 1121   Discharge disposition   Expected discharge disposition [facility] Provider Ron [facility]ard   Discharge transportation [facility]     LUISA received notice from [address] now agreeable for DC today 8/19. LUISA updated pt's RN/Torie and SW Supervisor Tank lopez. UR CM Dimitris WEST also notified per earlier request.    LUISA updated pt's dtr Bernadette via phone - agreeable to DC plans and time.     LUISA spoke to [facility] w/ [facility] (COVID isolation) set for 4:30PM. PCS completed in Epic - pt's RN to print w/ pt's AVS.    Report phone #: [phone]    PLAN: Dania Conn of [facility], Ambulance set for 4:30PM, [address] completed        NOAH Berg, [address] [facility]

## 2022-08-19 NOTE — PROGRESS NOTES
Pt discharged, Melody Kathleen provided transport to [address] [place] [address]. Report given to RN at facility. Discharge paperwork and prescriptions reviewed, all questions and concerns addressed. IV access discontinued.

## 2022-08-19 NOTE — PLAN OF CARE
Problem: Patient Centered Care  Goal: Patient preferences are identified and integrated in the patient's plan of care  Description: Interventions:  - What would you like us to know as we care for you?  I live at home alone  - Provide timely, complete, and accurate information to patient/family  - Incorporate patient and family knowledge, values, beliefs, and cultural backgrounds into the planning and delivery of care  - Encourage patient/family to participate in care and decision-making at the level they choose  - Honor patient and family perspectives and choices  Outcome: Progressing     Problem: Diabetes/Glucose Control  Goal: Glucose maintained within prescribed range  Description: INTERVENTIONS:  - Monitor Blood Glucose as ordered  - Assess for signs and symptoms of hyperglycemia and hypoglycemia  - Administer ordered medications to maintain glucose within target range  - Assess barriers to adequate nutritional intake and initiate nutrition consult as needed  - Instruct patient on self management of diabetes  Outcome: Progressing     Problem: Patient/Family Goals  Goal: Patient/Family Long Term Goal  Description: Patient's Long Term Goal: Be healthy    Interventions:  - Monitor vital signs  - Monitor appropriate labs  - Pain management as needed  - Administer medications per order  - Follow MD orders  - Diagnostics per order  - Fall precautions  - Activity as tolerated  - Assist with activities of daily living as needed  - Update / inform patient and family on plan of care  - Discharge planning   - See additional Care Plan goals for specific interventions  Outcome: Progressing  Goal: Patient/Family Short Term Goal  Description: Patient's Short Term Goal: Go home    Interventions:   - Monitor vital signs  - Monitor appropriate labs  - Pain management as needed  - Administer medications per order  - Follow MD orders  - Diagnostics per order  - Fall precautions  - Activity as tolerated  - Assist with activities of daily living as needed  - Update / inform patient and family on plan of care   - See additional Care Plan goals for specific interventions  Outcome: Progressing     Problem: PAIN - ADULT  Goal: Verbalizes/displays adequate comfort level or patient's stated pain goal  Description: INTERVENTIONS:  - Encourage pt to monitor pain and request assistance  - Assess pain using appropriate pain scale  - Administer analgesics based on type and severity of pain and evaluate response  - Implement non-pharmacological measures as appropriate and evaluate response  - Consider cultural and social influences on pain and pain management  - Manage/alleviate anxiety  - Utilize distraction and/or relaxation techniques  - Monitor for opioid side effects  - Notify MD/LIP if interventions unsuccessful or patient reports new pain  - Anticipate increased pain with activity and pre-medicate as appropriate  Outcome: Progressing     Problem: RISK FOR INFECTION - ADULT  Goal: Absence of fever/infection during anticipated neutropenic period  Description: INTERVENTIONS  - Monitor WBC  - Administer growth factors as ordered  - Implement neutropenic guidelines  Outcome: Progressing     Problem: SAFETY ADULT - FALL  Goal: Free from fall injury  Description: INTERVENTIONS:  - Assess pt frequently for physical needs  - Identify cognitive and physical deficits and behaviors that affect risk of falls.   - [place] fall precautions as indicated by assessment.  - Educate pt/family on patient safety including physical limitations  - Instruct pt to call for assistance with activity based on assessment  - Modify environment to reduce risk of injury  - Provide assistive devices as appropriate  - Consider OT/PT consult to assist with strengthening/mobility  - Encourage toileting schedule  Outcome: Progressing     Problem: DISCHARGE PLANNING  Goal: Discharge to home or other facility with appropriate resources  Description: INTERVENTIONS:  - Identify barriers to discharge w/pt and caregiver  - Include patient/family/discharge partner in discharge planning  - Arrange for needed discharge resources and transportation as appropriate  - Identify discharge learning needs (meds, wound care, etc)  - Arrange for interpreters to assist at discharge as needed  - Consider post-discharge preferences of patient/family/discharge partner  - Complete POLST form as appropriate  - Assess patient's ability to be responsible for managing their own health  - Refer to Case Management Department for coordinating discharge planning if the patient needs post-hospital services based on physician/LIP order or complex needs related to functional status, cognitive ability or social support system  Outcome: Progressing

## 2022-08-19 NOTE — PLAN OF CARE
Problem: PAIN - ADULT  Goal: Verbalizes/displays adequate comfort level or patient's stated pain goal  Description: INTERVENTIONS:  - Encourage pt to monitor pain and request assistance  - Assess pain using appropriate pain scale  - Administer analgesics based on type and severity of pain and evaluate response  - Implement non-pharmacological measures as appropriate and evaluate response  - Consider cultural and social influences on pain and pain management  - Manage/alleviate anxiety  - Utilize distraction and/or relaxation techniques  - Monitor for opioid side effects  - Notify MD/LIP if interventions unsuccessful or patient reports new pain  - Anticipate increased pain with activity and pre-medicate as appropriate  Outcome: Progressing     Problem: RISK FOR INFECTION - ADULT  Goal: Absence of fever/infection during anticipated neutropenic period  Description: INTERVENTIONS  - Monitor WBC  - Administer growth factors as ordered  - Implement neutropenic guidelines  Outcome: Progressing     Problem: SAFETY ADULT - FALL  Goal: Free from fall injury  Description: INTERVENTIONS:  - Assess pt frequently for physical needs  - Identify cognitive and physical deficits and behaviors that affect risk of falls.   - [place] fall precautions as indicated by assessment.  - Educate pt/family on patient safety including physical limitations  - Instruct pt to call for assistance with activity based on assessment  - Modify environment to reduce risk of injury  - Provide assistive devices as appropriate  - Consider OT/PT consult to assist with strengthening/mobility  - Encourage toileting schedule  Outcome: Progressing     Problem: DISCHARGE PLANNING  Goal: Discharge to home or other facility with appropriate resources  Description: INTERVENTIONS:  - Identify barriers to discharge w/pt and caregiver  - Include patient/family/discharge partner in discharge planning  - Arrange for needed discharge resources and transportation as appropriate  - Identify discharge learning needs (meds, wound care, etc)  - Arrange for interpreters to assist at discharge as needed  - Consider post-discharge preferences of patient/family/discharge partner  - Complete POLST form as appropriate  - Assess patient's ability to be responsible for managing their own health  - Refer to Case Management Department for coordinating discharge planning if the patient needs post-hospital services based on physician/LIP order or complex needs related to functional status, cognitive ability or social support system  Outcome: Progressing     Problem: MUSCULOSKELETAL - ADULT  Goal: Return mobility to safest level of function  Description: INTERVENTIONS:  - Assess patient stability and activity tolerance for standing, transferring and ambulating w/ or w/o assistive devices  - Assist with transfers and ambulation using safe patient handling equipment as needed  - Ensure adequate protection for wounds/incisions during mobilization  - Obtain PT/OT consults as needed  - Advance activity as appropriate  - Communicate ordered activity level and limitations with patient/family  Outcome: Progressing     Problem: Impaired Functional Mobility  Goal: Achieve highest/safest level of mobility/gait  Description: Interventions:  - Assess patient's functional ability and stability  - Promote increasing activity/tolerance for mobility and gait  - Educate and engage patient/family in tolerated activity level and precautions    Outcome: Progressing     Problem: Impaired Activities of Daily Living  Goal: Achieve highest/safest level of independence in self care  Description: Interventions:  - Assess ability and encourage patient to participate in ADLs to maximize function  - Promote sitting position while performing ADLs such as feeding, grooming, and bathing  - Educate and encourage patient/family in tolerated functional activity level and precautions during self-care    Outcome: Progressing Problem: Patient Centered Care  Goal: Patient preferences are identified and integrated in the patient's plan of care  Description: Interventions:  - What would you like us to know as we care for you?  I live at home alone  - Provide timely, complete, and accurate information to patient/family  - Incorporate patient and family knowledge, values, beliefs, and cultural backgrounds into the planning and delivery of care  - Encourage patient/family to participate in care and decision-making at the level they choose  - Honor patient and family perspectives and choices  Outcome: Not Progressing     Problem: Diabetes/Glucose Control  Goal: Glucose maintained within prescribed range  Description: INTERVENTIONS:  - Monitor Blood Glucose as ordered  - Assess for signs and symptoms of hyperglycemia and hypoglycemia  - Administer ordered medications to maintain glucose within target range  - Assess barriers to adequate nutritional intake and initiate nutrition consult as needed  - Instruct patient on self management of diabetes  Outcome: Not Progressing     Problem: Patient/Family Goals  Goal: Patient/Family Long Term Goal  Description: Patient's Long Term Goal: Be healthy    Interventions:  - Monitor vital signs  - Monitor appropriate labs  - Pain management as needed  - Administer medications per order  - Follow MD orders  - Diagnostics per order  - Fall precautions  - Activity as tolerated  - Assist with activities of daily living as needed  - Update / inform patient and family on plan of care  - Discharge planning   - See additional Care Plan goals for specific interventions  Outcome: Not Progressing  Goal: Patient/Family Short Term Goal  Description: Patient's Short Term Goal: Go home    Interventions:   - Monitor vital signs  - Monitor appropriate labs  - Pain management as needed  - Administer medications per order  - Follow MD orders  - Diagnostics per order  - Fall precautions  - Activity as tolerated  - Assist with activities of daily living as needed  - Update / inform patient and family on plan of care   - See additional Care Plan goals for specific interventions  Outcome: Not Progressing

## 2022-08-19 NOTE — CM/SW NOTE
LUISA discussed pt's case w/ RN Chaitanya Cordell. Pt is new dx of COVID but is asymptomatic. RN confirmed no fever reducing meds given and no signs of fever. LUISA discussed w/ liaison Minnie Bustillo w/ [facility]. Per Minnie Bustillo, their policy requires new COVID dx to be 72hrs fever free. She has discussed at length w/ her building admins and they are not allowing any exceptions. Minnie Bustillo will continue to work w/ them to see if they will agree to DC tomorrow Saturday. As requested, LUISA updated pt's RN/Torie and SW Supervisor Michael Kc via [employer]. PLAN: Radha Guerrero of [facility], Ambulance on [address] & [facility] (through Sunday), PCS completed (needs date) - pending 'ok' from [address]        SW/ to remain available for support and/or discharge planning.          Priscilla Caicedo, MSW, [address] [facility]

## 2022-08-31 ENCOUNTER — INITIAL APN SNF VISIT (OUTPATIENT)
Facility: SKILLED NURSING FACILITY | Age: 87
End: 2022-08-31

## 2022-08-31 DIAGNOSIS — T07.XXXA ABRASIONS OF MULTIPLE SITES: ICD-10-CM

## 2022-08-31 DIAGNOSIS — R29.6 RECURRENT FALLS: ICD-10-CM

## 2022-08-31 DIAGNOSIS — U07.1 COVID-19: ICD-10-CM

## 2022-08-31 DIAGNOSIS — G30.1 LATE ONSET ALZHEIMER'S DISEASE WITHOUT BEHAVIORAL DISTURBANCE (HCC): ICD-10-CM

## 2022-08-31 DIAGNOSIS — F02.80 LATE ONSET ALZHEIMER'S DISEASE WITHOUT BEHAVIORAL DISTURBANCE (HCC): ICD-10-CM

## 2022-08-31 DIAGNOSIS — N30.00 ACUTE CYSTITIS WITHOUT HEMATURIA: ICD-10-CM

## 2022-08-31 DIAGNOSIS — I48.20 CHRONIC ATRIAL FIBRILLATION (HCC): ICD-10-CM

## 2022-08-31 PROCEDURE — 99310 SBSQ NF CARE HIGH MDM 45: CPT | Performed by: NURSE PRACTITIONER

## 2022-08-31 PROCEDURE — 1123F ACP DISCUSS/DSCN MKR DOCD: CPT | Performed by: NURSE PRACTITIONER

## 2022-09-02 ENCOUNTER — SNF VISIT (OUTPATIENT)
Facility: SKILLED NURSING FACILITY | Age: 87
End: 2022-09-02

## 2022-09-02 DIAGNOSIS — N30.00 ACUTE CYSTITIS WITHOUT HEMATURIA: ICD-10-CM

## 2022-09-02 DIAGNOSIS — G30.1 LATE ONSET ALZHEIMER'S DISEASE WITHOUT BEHAVIORAL DISTURBANCE (HCC): ICD-10-CM

## 2022-09-02 DIAGNOSIS — R29.6 MULTIPLE FALLS: ICD-10-CM

## 2022-09-02 DIAGNOSIS — I10 ESSENTIAL HYPERTENSION: ICD-10-CM

## 2022-09-02 DIAGNOSIS — F02.80 LATE ONSET ALZHEIMER'S DISEASE WITHOUT BEHAVIORAL DISTURBANCE (HCC): ICD-10-CM

## 2022-09-02 DIAGNOSIS — U07.1 COVID-19: ICD-10-CM

## 2022-09-02 DIAGNOSIS — I48.20 CHRONIC ATRIAL FIBRILLATION (HCC): ICD-10-CM

## 2022-09-02 DIAGNOSIS — E78.00 PURE HYPERCHOLESTEROLEMIA: ICD-10-CM

## 2022-09-02 DIAGNOSIS — T07.XXXA ABRASIONS OF MULTIPLE SITES: ICD-10-CM

## 2022-09-07 ENCOUNTER — SNF VISIT (OUTPATIENT)
Facility: SKILLED NURSING FACILITY | Age: 87
End: 2022-09-07

## 2022-09-07 DIAGNOSIS — D72.829 LEUKOCYTOSIS, UNSPECIFIED TYPE: ICD-10-CM

## 2022-09-07 DIAGNOSIS — N30.00 ACUTE CYSTITIS WITHOUT HEMATURIA: ICD-10-CM

## 2022-09-07 DIAGNOSIS — S80.211A ABRASION OF KNEE, BILATERAL: ICD-10-CM

## 2022-09-07 DIAGNOSIS — S80.212A ABRASION OF KNEE, BILATERAL: ICD-10-CM

## 2022-09-07 DIAGNOSIS — U07.1 COVID-19: ICD-10-CM

## 2022-09-07 DIAGNOSIS — I10 ESSENTIAL HYPERTENSION: ICD-10-CM

## 2022-09-07 DIAGNOSIS — F02.80 ALZHEIMER'S DEMENTIA WITHOUT BEHAVIORAL DISTURBANCE, UNSPECIFIED TIMING OF DEMENTIA ONSET (HCC): ICD-10-CM

## 2022-09-07 DIAGNOSIS — G30.9 ALZHEIMER'S DEMENTIA WITHOUT BEHAVIORAL DISTURBANCE, UNSPECIFIED TIMING OF DEMENTIA ONSET (HCC): ICD-10-CM

## 2022-09-07 PROCEDURE — 1111F DSCHRG MED/CURRENT MED MERGE: CPT | Performed by: NURSE PRACTITIONER

## 2022-09-07 PROCEDURE — 99309 SBSQ NF CARE MODERATE MDM 30: CPT | Performed by: NURSE PRACTITIONER

## 2022-09-08 ENCOUNTER — APPOINTMENT (OUTPATIENT)
Dept: GENERAL RADIOLOGY | Age: 87
DRG: 871 | End: 2022-09-08
Attending: EMERGENCY MEDICINE

## 2022-09-08 ENCOUNTER — HOSPITAL ENCOUNTER (INPATIENT)
Age: 87
LOS: 4 days | Discharge: HOSPICE | DRG: 871 | End: 2022-09-13
Attending: EMERGENCY MEDICINE | Admitting: FAMILY MEDICINE

## 2022-09-08 DIAGNOSIS — F02.80 ALZHEIMER'S DEMENTIA WITHOUT BEHAVIORAL DISTURBANCE, UNSPECIFIED TIMING OF DEMENTIA ONSET: Chronic | ICD-10-CM

## 2022-09-08 DIAGNOSIS — G30.9 ALZHEIMER'S DEMENTIA WITH BEHAVIORAL DISTURBANCE, UNSPECIFIED TIMING OF DEMENTIA ONSET: Chronic | ICD-10-CM

## 2022-09-08 DIAGNOSIS — J90 PLEURAL EFFUSION: ICD-10-CM

## 2022-09-08 DIAGNOSIS — B95.62 MRSA BACTEREMIA: Primary | ICD-10-CM

## 2022-09-08 DIAGNOSIS — R78.81 MRSA BACTEREMIA: Primary | ICD-10-CM

## 2022-09-08 DIAGNOSIS — J18.9 PNEUMONIA OF BOTH LOWER LOBES DUE TO INFECTIOUS ORGANISM: ICD-10-CM

## 2022-09-08 DIAGNOSIS — E83.42 HYPOMAGNESEMIA: ICD-10-CM

## 2022-09-08 DIAGNOSIS — G30.9 ALZHEIMER'S DEMENTIA WITHOUT BEHAVIORAL DISTURBANCE, UNSPECIFIED TIMING OF DEMENTIA ONSET: Chronic | ICD-10-CM

## 2022-09-08 DIAGNOSIS — A41.9 SEPSIS DUE TO PNEUMONIA (CMD): ICD-10-CM

## 2022-09-08 DIAGNOSIS — J18.9 SEPSIS DUE TO PNEUMONIA (CMD): ICD-10-CM

## 2022-09-08 DIAGNOSIS — F02.81 ALZHEIMER'S DEMENTIA WITH BEHAVIORAL DISTURBANCE, UNSPECIFIED TIMING OF DEMENTIA ONSET: Chronic | ICD-10-CM

## 2022-09-08 DIAGNOSIS — R50.9 FEVER, UNSPECIFIED FEVER CAUSE: ICD-10-CM

## 2022-09-08 DIAGNOSIS — N18.31 STAGE 3A CHRONIC KIDNEY DISEASE (CMD): Chronic | ICD-10-CM

## 2022-09-08 PROCEDURE — 36415 COLL VENOUS BLD VENIPUNCTURE: CPT

## 2022-09-08 PROCEDURE — 96366 THER/PROPH/DIAG IV INF ADDON: CPT

## 2022-09-08 PROCEDURE — 87186 SC STD MICRODIL/AGAR DIL: CPT | Performed by: EMERGENCY MEDICINE

## 2022-09-08 PROCEDURE — 87154 CUL TYP ID BLD PTHGN 6+ TRGT: CPT | Performed by: EMERGENCY MEDICINE

## 2022-09-08 PROCEDURE — 85025 COMPLETE CBC W/AUTO DIFF WBC: CPT | Performed by: EMERGENCY MEDICINE

## 2022-09-08 PROCEDURE — 80048 BASIC METABOLIC PNL TOTAL CA: CPT | Performed by: EMERGENCY MEDICINE

## 2022-09-08 PROCEDURE — 96361 HYDRATE IV INFUSION ADD-ON: CPT

## 2022-09-08 PROCEDURE — 71045 X-RAY EXAM CHEST 1 VIEW: CPT

## 2022-09-08 PROCEDURE — 83735 ASSAY OF MAGNESIUM: CPT | Performed by: EMERGENCY MEDICINE

## 2022-09-08 PROCEDURE — 96365 THER/PROPH/DIAG IV INF INIT: CPT

## 2022-09-08 PROCEDURE — 84145 PROCALCITONIN (PCT): CPT | Performed by: EMERGENCY MEDICINE

## 2022-09-08 PROCEDURE — 83605 ASSAY OF LACTIC ACID: CPT | Performed by: EMERGENCY MEDICINE

## 2022-09-08 PROCEDURE — 96367 TX/PROPH/DG ADDL SEQ IV INF: CPT

## 2022-09-08 PROCEDURE — 99285 EMERGENCY DEPT VISIT HI MDM: CPT

## 2022-09-08 PROCEDURE — 80076 HEPATIC FUNCTION PANEL: CPT | Performed by: EMERGENCY MEDICINE

## 2022-09-09 ENCOUNTER — APPOINTMENT (OUTPATIENT)
Dept: CT IMAGING | Age: 87
DRG: 871 | End: 2022-09-09
Attending: EMERGENCY MEDICINE

## 2022-09-09 PROBLEM — J90 PLEURAL EFFUSION: Status: ACTIVE | Noted: 2022-09-09

## 2022-09-09 PROBLEM — J18.9 PNEUMONIA DUE TO INFECTIOUS ORGANISM: Status: ACTIVE | Noted: 2022-09-09

## 2022-09-09 LAB
A BAUMANNII DNA BLD POS QL NAA+NON-PROBE: NOT DETECTED
ALBUMIN SERPL-MCNC: 2.3 G/DL (ref 3.6–5.1)
ALP SERPL-CCNC: 152 UNITS/L (ref 45–117)
ALT SERPL-CCNC: 27 UNITS/L
ANION GAP SERPL CALC-SCNC: 10 MMOL/L (ref 7–19)
APPEARANCE UR: CLEAR
AST SERPL-CCNC: 30 UNITS/L
B FRAGILIS DNA BLD POS QL NAA+NON-PROBE: NOT DETECTED
BACTERIA #/AREA URNS HPF: ABNORMAL /HPF
BASOPHILS # BLD: 0 K/MCL (ref 0–0.3)
BASOPHILS NFR BLD: 0 %
BILIRUB CONJ SERPL-MCNC: 0.4 MG/DL (ref 0–0.2)
BILIRUB SERPL-MCNC: 1.1 MG/DL (ref 0.2–1)
BILIRUB UR QL STRIP: NEGATIVE
BUN SERPL-MCNC: 26 MG/DL (ref 6–20)
BUN/CREAT SERPL: 25 (ref 7–25)
C ALBICANS DNA BLD POS QL NAA+NON-PROBE: NOT DETECTED
C AURIS DNA BLD POS QL NAA+NON-PROBE: NOT DETECTED
C GATTII+NEOFOR DNA BLD POS QL NAA+N-PRB: NOT DETECTED
C GLABRATA DNA BLD POS QL NAA+NON-PROBE: NOT DETECTED
C KRUSEI DNA BLD POS QL NAA+NON-PROBE: NOT DETECTED
C PARAP DNA BLD POS QL NAA+NON-PROBE: NOT DETECTED
C TROPICLS DNA BLD POS QL NAA+NON-PROBE: NOT DETECTED
CALCIUM SERPL-MCNC: 8.7 MG/DL (ref 8.4–10.2)
CHLORIDE SERPL-SCNC: 103 MMOL/L (ref 97–110)
CO2 SERPL-SCNC: 29 MMOL/L (ref 21–32)
COLOR UR: YELLOW
CREAT SERPL-MCNC: 1.04 MG/DL (ref 0.67–1.17)
DEPRECATED RDW RBC: 58.1 FL (ref 39–50)
E CLOAC COMP DNA BLD POS NAA+NON-PROBE: NOT DETECTED
E COLI DNA BLD POS QL NAA+NON-PROBE: NOT DETECTED
E FAECALIS DNA BLD POS QL NAA+NON-PROBE: NOT DETECTED
E FAECIUM DNA BLD POS QL NAA+NON-PROBE: NOT DETECTED
ENTEROBACTERALES DNA BLD POS NAA+N-PRB: NOT DETECTED
EOSINOPHIL # BLD: 0 K/MCL (ref 0–0.5)
EOSINOPHIL NFR BLD: 0 %
ERYTHROCYTE [DISTWIDTH] IN BLOOD: 16.1 % (ref 11–15)
FASTING DURATION TIME PATIENT: ABNORMAL H
GFR SERPLBLD BASED ON 1.73 SQ M-ARVRAT: 68 ML/MIN
GLUCOSE BLDC GLUCOMTR-MCNC: 89 MG/DL (ref 70–99)
GLUCOSE SERPL-MCNC: 142 MG/DL (ref 70–99)
GLUCOSE UR STRIP-MCNC: NEGATIVE MG/DL
GP B STREP DNA CSF QL NAA+NON-PROBE: NOT DETECTED
HAEM INFLU DNA BLD POS QL NAA+NON-PROBE: NOT DETECTED
HCT VFR BLD CALC: 41.9 % (ref 39–51)
HGB BLD-MCNC: 13.9 G/DL (ref 13–17)
HGB UR QL STRIP: ABNORMAL
HYALINE CASTS #/AREA URNS LPF: ABNORMAL /LPF
IMM GRANULOCYTES # BLD AUTO: 0.2 K/MCL (ref 0–0.2)
IMM GRANULOCYTES # BLD: 1 %
K OXYTOCA DNA BLD POS QL NAA+NON-PROBE: NOT DETECTED
KETONES UR STRIP-MCNC: NEGATIVE MG/DL
KLEBSIELLA SP DNA BLD POS QL NAA+NON-PRB: NOT DETECTED
KLEBSIELLA SP DNA BLD POS QL NAA+NON-PRB: NOT DETECTED
L MONOCYTOG DNA BLD POS QL NAA+NON-PROBE: NOT DETECTED
LACTATE BLDV-SCNC: 2 MMOL/L (ref 0–2)
LEUKOCYTE ESTERASE UR QL STRIP: NEGATIVE
LYMPHOCYTES # BLD: 0.4 K/MCL (ref 1–4)
LYMPHOCYTES NFR BLD: 2 %
MAGNESIUM SERPL-MCNC: 1.6 MG/DL (ref 1.7–2.4)
MCH RBC QN AUTO: 32.4 PG (ref 26–34)
MCHC RBC AUTO-ENTMCNC: 33.2 G/DL (ref 32–36.5)
MCV RBC AUTO: 97.7 FL (ref 78–100)
MONOCYTES # BLD: 1.8 K/MCL (ref 0.3–0.9)
MONOCYTES NFR BLD: 9 %
MRSA DNA SPEC QL NAA+PROBE: NOT DETECTED
MUCOUS THREADS URNS QL MICRO: PRESENT
N MEN DNA BLD POS QL NAA+NON-PROBE: NOT DETECTED
NEUTROPHILS # BLD: 17.1 K/MCL (ref 1.8–7.7)
NEUTROPHILS NFR BLD: 88 %
NITRITE UR QL STRIP: NEGATIVE
NRBC BLD MANUAL-RTO: 0 /100 WBC
P AERUGINOSA DNA BLD POS NAA+NON-PROBE: NOT DETECTED
PH UR STRIP: 5 [PH] (ref 5–7)
PLATELET # BLD AUTO: 181 K/MCL (ref 140–450)
POTASSIUM SERPL-SCNC: 4 MMOL/L (ref 3.4–5.1)
PROCALCITONIN SERPL IA-MCNC: 1.25 NG/ML
PROT SERPL-MCNC: 6.3 G/DL (ref 6.4–8.2)
PROT UR STRIP-MCNC: 30 MG/DL
PROTEUS SP DNA BLD POS QL NAA+NON-PROBE: NOT DETECTED
RAINBOW EXTRA TUBES HOLD SPECIMEN: NORMAL
RBC # BLD: 4.29 MIL/MCL (ref 4.5–5.9)
RBC #/AREA URNS HPF: ABNORMAL /HPF
S EPIDERMIDIS DNA BLD POS QL NAA+NON-PRB: NOT DETECTED
S LUGDUNENSIS DNA BLD POS QL NAA+NON-PRB: NOT DETECTED
S MALTOPHILIA DNA BLD POS QL NAA+NON-PRB: NOT DETECTED
S MARCESCENS DNA BLD POS NAA+NON-PROBE: NOT DETECTED
S PNEUM DNA BLD POS QL NAA+NON-PROBE: NOT DETECTED
S PYO DNA BLD POS QL NAA+NON-PROBE: NOT DETECTED
SALMONELLA DNA BLD POS QL NAA+NON-PROBE: NOT DETECTED
SARS-COV-2 N GENE CT SPEC QN NAA N2: 42.6
SARS-COV-2 RNA RESP QL NAA+PROBE: DETECTED
SERVICE CMNT-IMP: ABNORMAL
SODIUM SERPL-SCNC: 138 MMOL/L (ref 135–145)
SP GR UR STRIP: 1.02 (ref 1–1.03)
SQUAMOUS #/AREA URNS HPF: ABNORMAL /HPF
STAPHYLOCOCCUS AUREUS: ABNORMAL
STREPTOCOCCUS DNA BLD POS NAA+NON-PROBE: NOT DETECTED
UROBILINOGEN UR STRIP-MCNC: 0.2 MG/DL
WBC # BLD: 19.6 K/MCL (ref 4.2–11)
WBC #/AREA URNS HPF: ABNORMAL /HPF

## 2022-09-09 PROCEDURE — G0378 HOSPITAL OBSERVATION PER HR: HCPCS

## 2022-09-09 PROCEDURE — 10004651 HB RX, NO CHARGE ITEM: Performed by: FAMILY MEDICINE

## 2022-09-09 PROCEDURE — 71260 CT THORAX DX C+: CPT

## 2022-09-09 PROCEDURE — 81001 URINALYSIS AUTO W/SCOPE: CPT | Performed by: EMERGENCY MEDICINE

## 2022-09-09 PROCEDURE — 10002800 HB RX 250 W HCPCS: Performed by: INTERNAL MEDICINE

## 2022-09-09 PROCEDURE — 10002800 HB RX 250 W HCPCS: Performed by: FAMILY MEDICINE

## 2022-09-09 PROCEDURE — 13003289 HB OXYGEN THERAPY DAILY

## 2022-09-09 PROCEDURE — 10002801 HB RX 250 W/O HCPCS: Performed by: INTERNAL MEDICINE

## 2022-09-09 PROCEDURE — 74177 CT ABD & PELVIS W/CONTRAST: CPT

## 2022-09-09 PROCEDURE — 10002807 HB RX 258: Performed by: FAMILY MEDICINE

## 2022-09-09 PROCEDURE — 10002807 HB RX 258: Performed by: EMERGENCY MEDICINE

## 2022-09-09 PROCEDURE — 93005 ELECTROCARDIOGRAM TRACING: CPT | Performed by: FAMILY MEDICINE

## 2022-09-09 PROCEDURE — G1004 CDSM NDSC: HCPCS

## 2022-09-09 PROCEDURE — 10002803 HB RX 637: Performed by: INTERNAL MEDICINE

## 2022-09-09 PROCEDURE — 93005 ELECTROCARDIOGRAM TRACING: CPT | Performed by: EMERGENCY MEDICINE

## 2022-09-09 PROCEDURE — 99223 1ST HOSP IP/OBS HIGH 75: CPT | Performed by: INTERNAL MEDICINE

## 2022-09-09 PROCEDURE — 10002800 HB RX 250 W HCPCS: Performed by: EMERGENCY MEDICINE

## 2022-09-09 PROCEDURE — 87641 MR-STAPH DNA AMP PROBE: CPT | Performed by: INTERNAL MEDICINE

## 2022-09-09 PROCEDURE — 10002803 HB RX 637: Performed by: EMERGENCY MEDICINE

## 2022-09-09 PROCEDURE — 87635 SARS-COV-2 COVID-19 AMP PRB: CPT | Performed by: INTERNAL MEDICINE

## 2022-09-09 PROCEDURE — 10003585 HB ROOM CHARGE INTERMEDIATE CARE

## 2022-09-09 PROCEDURE — 99205 OFFICE O/P NEW HI 60 MIN: CPT | Performed by: INTERNAL MEDICINE

## 2022-09-09 PROCEDURE — 93010 ELECTROCARDIOGRAM REPORT: CPT | Performed by: INTERNAL MEDICINE

## 2022-09-09 PROCEDURE — 87040 BLOOD CULTURE FOR BACTERIA: CPT | Performed by: EMERGENCY MEDICINE

## 2022-09-09 PROCEDURE — 10002805 HB CONTRAST AGENT: Performed by: EMERGENCY MEDICINE

## 2022-09-09 PROCEDURE — 10002807 HB RX 258: Performed by: INTERNAL MEDICINE

## 2022-09-09 PROCEDURE — 10004651 HB RX, NO CHARGE ITEM: Performed by: EMERGENCY MEDICINE

## 2022-09-09 RX ORDER — 0.9 % SODIUM CHLORIDE 0.9 %
2 VIAL (ML) INJECTION EVERY 12 HOURS SCHEDULED
Status: DISCONTINUED | OUTPATIENT
Start: 2022-09-09 | End: 2022-09-13 | Stop reason: HOSPADM

## 2022-09-09 RX ORDER — DONEPEZIL HYDROCHLORIDE 10 MG/1
10 TABLET, FILM COATED ORAL EVERY EVENING
COMMUNITY

## 2022-09-09 RX ORDER — ENOXAPARIN SODIUM 100 MG/ML
40 INJECTION SUBCUTANEOUS DAILY
Status: DISCONTINUED | OUTPATIENT
Start: 2022-09-09 | End: 2022-09-13 | Stop reason: HOSPADM

## 2022-09-09 RX ORDER — ACETAMINOPHEN 500 MG
1000 TABLET ORAL ONCE
Status: COMPLETED | OUTPATIENT
Start: 2022-09-09 | End: 2022-09-09

## 2022-09-09 RX ORDER — SODIUM CHLORIDE 9 MG/ML
INJECTION, SOLUTION INTRAVENOUS CONTINUOUS
Status: DISCONTINUED | OUTPATIENT
Start: 2022-09-09 | End: 2022-09-10

## 2022-09-09 RX ORDER — LANOLIN ALCOHOL/MO/W.PET/CERES
400 CREAM (GRAM) TOPICAL DAILY
Status: DISCONTINUED | OUTPATIENT
Start: 2022-09-09 | End: 2022-09-09

## 2022-09-09 RX ORDER — LANOLIN ALCOHOL/MO/W.PET/CERES
400 CREAM (GRAM) TOPICAL ONCE
Status: COMPLETED | OUTPATIENT
Start: 2022-09-09 | End: 2022-09-09

## 2022-09-09 RX ORDER — DONEPEZIL HYDROCHLORIDE 10 MG/1
10 TABLET, FILM COATED ORAL EVERY EVENING
Status: DISCONTINUED | OUTPATIENT
Start: 2022-09-09 | End: 2022-09-13 | Stop reason: HOSPADM

## 2022-09-09 RX ORDER — A/C/E/ZINC/SOD SELENATE/COPPER 5000-60-30
1 TABLET ORAL 2 TIMES DAILY
COMMUNITY

## 2022-09-09 RX ORDER — POVIDONE-IODINE 10 MG/ML
1 SOLUTION TOPICAL 2 TIMES DAILY
COMMUNITY

## 2022-09-09 RX ORDER — MEMANTINE HYDROCHLORIDE 10 MG/1
10 TABLET ORAL 2 TIMES DAILY
Status: DISCONTINUED | OUTPATIENT
Start: 2022-09-09 | End: 2022-09-13 | Stop reason: HOSPADM

## 2022-09-09 RX ORDER — ZINC SULFATE 50(220)MG
220 CAPSULE ORAL DAILY
Status: DISCONTINUED | OUTPATIENT
Start: 2022-09-09 | End: 2022-09-13 | Stop reason: HOSPADM

## 2022-09-09 RX ORDER — ACETAMINOPHEN 650 MG/1
650 SUPPOSITORY RECTAL EVERY 6 HOURS PRN
Status: DISCONTINUED | OUTPATIENT
Start: 2022-09-09 | End: 2022-09-13 | Stop reason: HOSPADM

## 2022-09-09 RX ORDER — MEMANTINE HYDROCHLORIDE 10 MG/1
10 TABLET ORAL 2 TIMES DAILY
COMMUNITY

## 2022-09-09 RX ORDER — ACETAMINOPHEN 325 MG/1
650 TABLET ORAL EVERY 6 HOURS PRN
COMMUNITY

## 2022-09-09 RX ORDER — METOPROLOL TARTRATE 1 MG/ML
2.5 INJECTION, SOLUTION INTRAVENOUS EVERY 6 HOURS SCHEDULED
Status: DISCONTINUED | OUTPATIENT
Start: 2022-09-09 | End: 2022-09-11

## 2022-09-09 RX ORDER — ZINC SULFATE 50(220)MG
220 CAPSULE ORAL DAILY
COMMUNITY

## 2022-09-09 RX ADMIN — METOPROLOL TARTRATE 2.5 MG: 1 INJECTION, SOLUTION INTRAVENOUS at 22:40

## 2022-09-09 RX ADMIN — CEFEPIME 1000 MG: 1 INJECTION, POWDER, FOR SOLUTION INTRAMUSCULAR; INTRAVENOUS at 01:31

## 2022-09-09 RX ADMIN — SODIUM CHLORIDE, PRESERVATIVE FREE 2 ML: 5 INJECTION INTRAVENOUS at 14:31

## 2022-09-09 RX ADMIN — IOHEXOL 80 ML: 350 INJECTION, SOLUTION INTRAVENOUS at 02:41

## 2022-09-09 RX ADMIN — ACETAMINOPHEN 650 MG: 650 SUPPOSITORY RECTAL at 22:40

## 2022-09-09 RX ADMIN — CEFEPIME 2000 MG: 2 INJECTION, POWDER, FOR SOLUTION INTRAVENOUS at 14:36

## 2022-09-09 RX ADMIN — VANCOMYCIN HYDROCHLORIDE 1000 MG: 1 INJECTION, POWDER, LYOPHILIZED, FOR SOLUTION INTRAVENOUS at 23:32

## 2022-09-09 RX ADMIN — SODIUM CHLORIDE 1000 ML: 9 INJECTION, SOLUTION INTRAVENOUS at 00:20

## 2022-09-09 RX ADMIN — Medication 1750 MG: at 02:04

## 2022-09-09 RX ADMIN — SODIUM CHLORIDE 25 ML: 9 INJECTION, SOLUTION INTRAVENOUS at 14:44

## 2022-09-09 RX ADMIN — ENOXAPARIN SODIUM 40 MG: 40 INJECTION SUBCUTANEOUS at 14:31

## 2022-09-09 RX ADMIN — Medication 400 MG: at 05:00

## 2022-09-09 RX ADMIN — SODIUM CHLORIDE: 9 INJECTION, SOLUTION INTRAVENOUS at 14:35

## 2022-09-09 RX ADMIN — ACETAMINOPHEN 1000 MG: 500 TABLET ORAL at 00:16

## 2022-09-09 ASSESSMENT — PAIN SCALES - PAIN ASSESSMENT IN ADVANCED DEMENTIA (PAINAD)
BODYLANGUAGE: RELAXED
TOTALSCORE: 0
FACIALEXPRESSION: SMILING OR INEXPRESSIVE
FACIALEXPRESSION: SMILING OR INEXPRESSIVE
CONSOLABILITY: NO NEED TO CONSOLE
CONSOLABILITY: NO NEED TO CONSOLE
BODYLANGUAGE: RELAXED
TOTALSCORE: 0
BREATHING: NORMAL
BREATHING: NORMAL

## 2022-09-09 ASSESSMENT — COGNITIVE AND FUNCTIONAL STATUS - GENERAL
DO YOU HAVE DIFFICULTY DRESSING OR BATHING: YES
BECAUSE OF A PHYSICAL, MENTAL, OR EMOTIONAL CONDITION, DO YOU HAVE DIFFICULTY DOING ERRANDS ALONE: YES
BECAUSE OF A PHYSICAL, MENTAL, OR EMOTIONAL CONDITION, DO YOU HAVE SERIOUS DIFFICULTY CONCENTRATING, REMEMBERING OR MAKING DECISIONS: NO
DO YOU HAVE SERIOUS DIFFICULTY WALKING OR CLIMBING STAIRS: YES
DO YOU HAVE DIFFICULTY DRESSING OR BATHING: YES
DO YOU HAVE SERIOUS DIFFICULTY WALKING OR CLIMBING STAIRS: YES
BECAUSE OF A PHYSICAL, MENTAL, OR EMOTIONAL CONDITION, DO YOU HAVE SERIOUS DIFFICULTY CONCENTRATING, REMEMBERING OR MAKING DECISIONS: NO
BECAUSE OF A PHYSICAL, MENTAL, OR EMOTIONAL CONDITION, DO YOU HAVE DIFFICULTY DOING ERRANDS ALONE: YES

## 2022-09-09 ASSESSMENT — LIFESTYLE VARIABLES
HOW OFTEN DO YOU HAVE 6 OR MORE DRINKS ON ONE OCCASION: NEVER
MOBILITY ASSIST DEVICES: STANDARD WALKER
CHRONIC/CANCER PAIN PRESENT: NO
ADL BEFORE ADMISSION: INDEPENDENT
AUDIT-C TOTAL SCORE: 0
ADL NEEDS ASSIST: NO
IS PATIENT ABLE TO COMPLETE ASSESSMENT AT THIS TIME: NO (T)
HISTORY OF PROBLEMS WHEN YOU STOP DRINKING ALCOHOL: NO
ARE YOU BLIND OR DO YOU HAVE SERIOUS DIFFICULTY SEEING, EVEN WHEN WEARING GLASSES: NO
LAST DRINK YOU HAD: DENIES INTAKE
HOW MANY STANDARD DRINKS CONTAINING ALCOHOL DO YOU HAVE ON A TYPICAL DAY: 0,1 OR 2
ARE YOU DEAF OR DO YOU HAVE SERIOUS DIFFICULTY  HEARING: YES
ALCOHOL_USE_STATUS: NO OR LOW RISK WITH VALIDATED TOOL
HOW OFTEN DO YOU HAVE A DRINK CONTAINING ALCOHOL: NEVER
SHORT OF BREATH OR FATIGUE WITH ADLS: NO
RECENT DECLINE IN ADLS: NO

## 2022-09-09 ASSESSMENT — COLUMBIA-SUICIDE SEVERITY RATING SCALE - C-SSRS: IS THE PATIENT ABLE TO COMPLETE C-SSRS: NO, DEFER FOR ACUTE CONFUSION

## 2022-09-09 ASSESSMENT — PATIENT HEALTH QUESTIONNAIRE - PHQ9: IS PATIENT ABLE TO COMPLETE PHQ2 OR PHQ9: NO, PATIENT WILL NEVER BE ABLE TO COMPLETE

## 2022-09-09 ASSESSMENT — ACTIVITIES OF DAILY LIVING (ADL)
ADL_SCORE: 12
SENSORY_SUPPORT_DEVICES: DENTURES

## 2022-09-10 ENCOUNTER — APPOINTMENT (OUTPATIENT)
Dept: CARDIOLOGY | Age: 87
DRG: 871 | End: 2022-09-10
Attending: INTERNAL MEDICINE

## 2022-09-10 LAB
ANION GAP SERPL CALC-SCNC: 9 MMOL/L (ref 7–19)
ATRIAL RATE (BPM): 105
BASOPHILS # BLD: 0.1 K/MCL (ref 0–0.3)
BASOPHILS NFR BLD: 0 %
BUN SERPL-MCNC: 40 MG/DL (ref 6–20)
BUN/CREAT SERPL: 35 (ref 7–25)
CALCIUM SERPL-MCNC: 8.1 MG/DL (ref 8.4–10.2)
CHLORIDE SERPL-SCNC: 110 MMOL/L (ref 97–110)
CO2 SERPL-SCNC: 26 MMOL/L (ref 21–32)
CREAT SERPL-MCNC: 1.14 MG/DL (ref 0.67–1.17)
DEPRECATED RDW RBC: 58.5 FL (ref 39–50)
EOSINOPHIL # BLD: 0.1 K/MCL (ref 0–0.5)
EOSINOPHIL NFR BLD: 1 %
ERYTHROCYTE [DISTWIDTH] IN BLOOD: 16.4 % (ref 11–15)
FASTING DURATION TIME PATIENT: ABNORMAL H
GFR SERPLBLD BASED ON 1.73 SQ M-ARVRAT: 61 ML/MIN
GLUCOSE SERPL-MCNC: 114 MG/DL (ref 70–99)
HCT VFR BLD CALC: 36.5 % (ref 39–51)
HGB BLD-MCNC: 12.1 G/DL (ref 13–17)
IMM GRANULOCYTES # BLD AUTO: 0.1 K/MCL (ref 0–0.2)
IMM GRANULOCYTES # BLD: 1 %
LYMPHOCYTES # BLD: 0.3 K/MCL (ref 1–4)
LYMPHOCYTES NFR BLD: 2 %
MCH RBC QN AUTO: 32.2 PG (ref 26–34)
MCHC RBC AUTO-ENTMCNC: 33.2 G/DL (ref 32–36.5)
MCV RBC AUTO: 97.1 FL (ref 78–100)
MONOCYTES # BLD: 1.3 K/MCL (ref 0.3–0.9)
MONOCYTES NFR BLD: 9 %
NEUTROPHILS # BLD: 12.5 K/MCL (ref 1.8–7.7)
NEUTROPHILS NFR BLD: 87 %
NRBC BLD MANUAL-RTO: 0 /100 WBC
PLATELET # BLD AUTO: 108 K/MCL (ref 140–450)
POTASSIUM SERPL-SCNC: 4 MMOL/L (ref 3.4–5.1)
QRS-INTERVAL (MSEC): 66
QRS-INTERVAL (MSEC): 76
QT-INTERVAL (MSEC): 284
QT-INTERVAL (MSEC): 326
QTC: 403
QTC: 435
R AXIS (DEGREES): -32
R AXIS (DEGREES): -32
RBC # BLD: 3.76 MIL/MCL (ref 4.5–5.9)
REPORT TEXT: NORMAL
REPORT TEXT: NORMAL
SODIUM SERPL-SCNC: 141 MMOL/L (ref 135–145)
T AXIS (DEGREES): 31
T AXIS (DEGREES): 56
VENTRICULAR RATE EKG/MIN (BPM): 107
VENTRICULAR RATE EKG/MIN (BPM): 121
WBC # BLD: 14.4 K/MCL (ref 4.2–11)

## 2022-09-10 PROCEDURE — 36415 COLL VENOUS BLD VENIPUNCTURE: CPT | Performed by: INTERNAL MEDICINE

## 2022-09-10 PROCEDURE — 93308 TTE F-UP OR LMTD: CPT

## 2022-09-10 PROCEDURE — 85025 COMPLETE CBC W/AUTO DIFF WBC: CPT | Performed by: INTERNAL MEDICINE

## 2022-09-10 PROCEDURE — 87040 BLOOD CULTURE FOR BACTERIA: CPT | Performed by: INTERNAL MEDICINE

## 2022-09-10 PROCEDURE — 10003585 HB ROOM CHARGE INTERMEDIATE CARE

## 2022-09-10 PROCEDURE — 10002807 HB RX 258: Performed by: INTERNAL MEDICINE

## 2022-09-10 PROCEDURE — 10002800 HB RX 250 W HCPCS: Performed by: INTERNAL MEDICINE

## 2022-09-10 PROCEDURE — 87077 CULTURE AEROBIC IDENTIFY: CPT | Performed by: INTERNAL MEDICINE

## 2022-09-10 PROCEDURE — 76376 3D RENDER W/INTRP POSTPROCES: CPT | Performed by: INTERNAL MEDICINE

## 2022-09-10 PROCEDURE — 93325 DOPPLER ECHO COLOR FLOW MAPG: CPT | Performed by: INTERNAL MEDICINE

## 2022-09-10 PROCEDURE — 99233 SBSQ HOSP IP/OBS HIGH 50: CPT | Performed by: INTERNAL MEDICINE

## 2022-09-10 PROCEDURE — 87154 CUL TYP ID BLD PTHGN 6+ TRGT: CPT | Performed by: INTERNAL MEDICINE

## 2022-09-10 PROCEDURE — 80048 BASIC METABOLIC PNL TOTAL CA: CPT | Performed by: INTERNAL MEDICINE

## 2022-09-10 PROCEDURE — 99233 SBSQ HOSP IP/OBS HIGH 50: CPT | Performed by: FAMILY MEDICINE

## 2022-09-10 PROCEDURE — 93321 DOPPLER ECHO F-UP/LMTD STD: CPT | Performed by: INTERNAL MEDICINE

## 2022-09-10 PROCEDURE — 10004651 HB RX, NO CHARGE ITEM: Performed by: FAMILY MEDICINE

## 2022-09-10 PROCEDURE — 10002800 HB RX 250 W HCPCS: Performed by: FAMILY MEDICINE

## 2022-09-10 PROCEDURE — 10002807 HB RX 258: Performed by: FAMILY MEDICINE

## 2022-09-10 PROCEDURE — 13003289 HB OXYGEN THERAPY DAILY

## 2022-09-10 PROCEDURE — 93308 TTE F-UP OR LMTD: CPT | Performed by: INTERNAL MEDICINE

## 2022-09-10 RX ORDER — DEXTROSE AND SODIUM CHLORIDE 5; .9 G/100ML; G/100ML
INJECTION, SOLUTION INTRAVENOUS CONTINUOUS
Status: DISCONTINUED | OUTPATIENT
Start: 2022-09-10 | End: 2022-09-11

## 2022-09-10 RX ADMIN — SODIUM CHLORIDE: 9 INJECTION, SOLUTION INTRAVENOUS at 09:01

## 2022-09-10 RX ADMIN — DEXTROSE AND SODIUM CHLORIDE: 5; 900 INJECTION, SOLUTION INTRAVENOUS at 11:47

## 2022-09-10 RX ADMIN — SODIUM CHLORIDE, PRESERVATIVE FREE 2 ML: 5 INJECTION INTRAVENOUS at 08:50

## 2022-09-10 RX ADMIN — DEXTROSE AND SODIUM CHLORIDE: 5; 900 INJECTION, SOLUTION INTRAVENOUS at 20:55

## 2022-09-10 RX ADMIN — ENOXAPARIN SODIUM 40 MG: 40 INJECTION SUBCUTANEOUS at 08:51

## 2022-09-10 RX ADMIN — CEFEPIME 2000 MG: 2 INJECTION, POWDER, FOR SOLUTION INTRAVENOUS at 01:38

## 2022-09-10 RX ADMIN — VANCOMYCIN HYDROCHLORIDE 1000 MG: 1 INJECTION, POWDER, LYOPHILIZED, FOR SOLUTION INTRAVENOUS at 22:50

## 2022-09-10 RX ADMIN — CEFEPIME 2000 MG: 2 INJECTION, POWDER, FOR SOLUTION INTRAVENOUS at 12:54

## 2022-09-10 ASSESSMENT — PAIN SCALES - PAIN ASSESSMENT IN ADVANCED DEMENTIA (PAINAD)
BREATHING: NORMAL
CONSOLABILITY: NO NEED TO CONSOLE
CONSOLABILITY: NO NEED TO CONSOLE
BODYLANGUAGE: RELAXED
TOTALSCORE: 0
BODYLANGUAGE: RELAXED
FACIALEXPRESSION: SMILING OR INEXPRESSIVE
FACIALEXPRESSION: SMILING OR INEXPRESSIVE
BREATHING: NORMAL
TOTALSCORE: 0

## 2022-09-11 LAB
A BAUMANNII DNA BLD POS QL NAA+NON-PROBE: NOT DETECTED
ANION GAP SERPL CALC-SCNC: 7 MMOL/L (ref 7–19)
B FRAGILIS DNA BLD POS QL NAA+NON-PROBE: NOT DETECTED
BUN SERPL-MCNC: 44 MG/DL (ref 6–20)
BUN/CREAT SERPL: 47 (ref 7–25)
C ALBICANS DNA BLD POS QL NAA+NON-PROBE: NOT DETECTED
C AURIS DNA BLD POS QL NAA+NON-PROBE: NOT DETECTED
C GATTII+NEOFOR DNA BLD POS QL NAA+N-PRB: NOT DETECTED
C GLABRATA DNA BLD POS QL NAA+NON-PROBE: NOT DETECTED
C KRUSEI DNA BLD POS QL NAA+NON-PROBE: NOT DETECTED
C PARAP DNA BLD POS QL NAA+NON-PROBE: NOT DETECTED
C TROPICLS DNA BLD POS QL NAA+NON-PROBE: NOT DETECTED
CALCIUM SERPL-MCNC: 8.3 MG/DL (ref 8.4–10.2)
CHLORIDE SERPL-SCNC: 114 MMOL/L (ref 97–110)
CO2 SERPL-SCNC: 27 MMOL/L (ref 21–32)
CREAT SERPL-MCNC: 0.94 MG/DL (ref 0.67–1.17)
DEPRECATED RDW RBC: 61.4 FL (ref 39–50)
E CLOAC COMP DNA BLD POS NAA+NON-PROBE: NOT DETECTED
E COLI DNA BLD POS QL NAA+NON-PROBE: NOT DETECTED
E FAECALIS DNA BLD POS QL NAA+NON-PROBE: NOT DETECTED
E FAECIUM DNA BLD POS QL NAA+NON-PROBE: NOT DETECTED
ENTEROBACTERALES DNA BLD POS NAA+N-PRB: NOT DETECTED
ERYTHROCYTE [DISTWIDTH] IN BLOOD: 16.6 % (ref 11–15)
FASTING DURATION TIME PATIENT: ABNORMAL H
GFR SERPLBLD BASED ON 1.73 SQ M-ARVRAT: 77 ML/MIN
GLUCOSE BLDC GLUCOMTR-MCNC: 152 MG/DL (ref 70–99)
GLUCOSE SERPL-MCNC: 168 MG/DL (ref 70–99)
GP B STREP DNA CSF QL NAA+NON-PROBE: NOT DETECTED
HAEM INFLU DNA BLD POS QL NAA+NON-PROBE: NOT DETECTED
HCT VFR BLD CALC: 42.3 % (ref 39–51)
HGB BLD-MCNC: 13.4 G/DL (ref 13–17)
K OXYTOCA DNA BLD POS QL NAA+NON-PROBE: NOT DETECTED
KLEBSIELLA SP DNA BLD POS QL NAA+NON-PRB: NOT DETECTED
KLEBSIELLA SP DNA BLD POS QL NAA+NON-PRB: NOT DETECTED
L MONOCYTOG DNA BLD POS QL NAA+NON-PROBE: NOT DETECTED
MAGNESIUM SERPL-MCNC: 1.9 MG/DL (ref 1.7–2.4)
MCH RBC QN AUTO: 31.7 PG (ref 26–34)
MCHC RBC AUTO-ENTMCNC: 31.7 G/DL (ref 32–36.5)
MCV RBC AUTO: 100 FL (ref 78–100)
N MEN DNA BLD POS QL NAA+NON-PROBE: NOT DETECTED
NRBC BLD MANUAL-RTO: 0 /100 WBC
P AERUGINOSA DNA BLD POS NAA+NON-PROBE: NOT DETECTED
PLATELET # BLD AUTO: 109 K/MCL (ref 140–450)
POTASSIUM SERPL-SCNC: 3.5 MMOL/L (ref 3.4–5.1)
POTASSIUM SERPL-SCNC: 3.7 MMOL/L (ref 3.4–5.1)
POTASSIUM SERPL-SCNC: 3.9 MMOL/L (ref 3.4–5.1)
PROTEUS SP DNA BLD POS QL NAA+NON-PROBE: NOT DETECTED
RBC # BLD: 4.23 MIL/MCL (ref 4.5–5.9)
S EPIDERMIDIS DNA BLD POS QL NAA+NON-PRB: NOT DETECTED
S LUGDUNENSIS DNA BLD POS QL NAA+NON-PRB: NOT DETECTED
S MALTOPHILIA DNA BLD POS QL NAA+NON-PRB: NOT DETECTED
S MARCESCENS DNA BLD POS NAA+NON-PROBE: NOT DETECTED
S PNEUM DNA BLD POS QL NAA+NON-PROBE: NOT DETECTED
S PYO DNA BLD POS QL NAA+NON-PROBE: NOT DETECTED
SALMONELLA DNA BLD POS QL NAA+NON-PROBE: NOT DETECTED
SODIUM SERPL-SCNC: 144 MMOL/L (ref 135–145)
STAPHYLOCOCCUS AUREUS: ABNORMAL
STREPTOCOCCUS DNA BLD POS NAA+NON-PROBE: NOT DETECTED
VANCOMYCIN TROUGH SERPL-MCNC: 11.1 MCG/ML (ref 10–20)
WBC # BLD: 14.7 K/MCL (ref 4.2–11)

## 2022-09-11 PROCEDURE — 10002803 HB RX 637: Performed by: FAMILY MEDICINE

## 2022-09-11 PROCEDURE — 84132 ASSAY OF SERUM POTASSIUM: CPT | Performed by: INTERNAL MEDICINE

## 2022-09-11 PROCEDURE — 99233 SBSQ HOSP IP/OBS HIGH 50: CPT | Performed by: INTERNAL MEDICINE

## 2022-09-11 PROCEDURE — 10002800 HB RX 250 W HCPCS: Performed by: INTERNAL MEDICINE

## 2022-09-11 PROCEDURE — 99233 SBSQ HOSP IP/OBS HIGH 50: CPT | Performed by: FAMILY MEDICINE

## 2022-09-11 PROCEDURE — 97530 THERAPEUTIC ACTIVITIES: CPT

## 2022-09-11 PROCEDURE — 97535 SELF CARE MNGMENT TRAINING: CPT

## 2022-09-11 PROCEDURE — 10002803 HB RX 637: Performed by: INTERNAL MEDICINE

## 2022-09-11 PROCEDURE — 85027 COMPLETE CBC AUTOMATED: CPT | Performed by: FAMILY MEDICINE

## 2022-09-11 PROCEDURE — 80048 BASIC METABOLIC PNL TOTAL CA: CPT | Performed by: FAMILY MEDICINE

## 2022-09-11 PROCEDURE — 87077 CULTURE AEROBIC IDENTIFY: CPT | Performed by: INTERNAL MEDICINE

## 2022-09-11 PROCEDURE — 80202 ASSAY OF VANCOMYCIN: CPT | Performed by: INTERNAL MEDICINE

## 2022-09-11 PROCEDURE — 87186 SC STD MICRODIL/AGAR DIL: CPT | Performed by: INTERNAL MEDICINE

## 2022-09-11 PROCEDURE — 36415 COLL VENOUS BLD VENIPUNCTURE: CPT | Performed by: FAMILY MEDICINE

## 2022-09-11 PROCEDURE — 10002807 HB RX 258: Performed by: INTERNAL MEDICINE

## 2022-09-11 PROCEDURE — 13003289 HB OXYGEN THERAPY DAILY

## 2022-09-11 PROCEDURE — 97162 PT EVAL MOD COMPLEX 30 MIN: CPT

## 2022-09-11 PROCEDURE — 10004651 HB RX, NO CHARGE ITEM: Performed by: FAMILY MEDICINE

## 2022-09-11 PROCEDURE — 10003585 HB ROOM CHARGE INTERMEDIATE CARE

## 2022-09-11 PROCEDURE — 83735 ASSAY OF MAGNESIUM: CPT | Performed by: INTERNAL MEDICINE

## 2022-09-11 PROCEDURE — 92610 EVALUATE SWALLOWING FUNCTION: CPT

## 2022-09-11 PROCEDURE — 10002807 HB RX 258: Performed by: FAMILY MEDICINE

## 2022-09-11 PROCEDURE — 10002800 HB RX 250 W HCPCS: Performed by: FAMILY MEDICINE

## 2022-09-11 PROCEDURE — 97166 OT EVAL MOD COMPLEX 45 MIN: CPT

## 2022-09-11 RX ORDER — SODIUM CHLORIDE 9 MG/ML
INJECTION, SOLUTION INTRAVENOUS CONTINUOUS
Status: DISCONTINUED | OUTPATIENT
Start: 2022-09-11 | End: 2022-09-12

## 2022-09-11 RX ORDER — POTASSIUM CHLORIDE 20 MEQ/1
40 TABLET, EXTENDED RELEASE ORAL ONCE
Status: COMPLETED | OUTPATIENT
Start: 2022-09-11 | End: 2022-09-11

## 2022-09-11 RX ORDER — POTASSIUM CHLORIDE 20 MEQ/1
40 TABLET, EXTENDED RELEASE ORAL ONCE
Status: COMPLETED | OUTPATIENT
Start: 2022-09-12 | End: 2022-09-12

## 2022-09-11 RX ADMIN — POTASSIUM CHLORIDE 40 MEQ: 1500 TABLET, EXTENDED RELEASE ORAL at 21:28

## 2022-09-11 RX ADMIN — METOPROLOL TARTRATE 25 MG: 25 TABLET, FILM COATED ORAL at 11:30

## 2022-09-11 RX ADMIN — CEFEPIME 2000 MG: 2 INJECTION, POWDER, FOR SOLUTION INTRAVENOUS at 13:25

## 2022-09-11 RX ADMIN — MEMANTINE HYDROCHLORIDE 10 MG: 10 TABLET, FILM COATED ORAL at 21:28

## 2022-09-11 RX ADMIN — SODIUM CHLORIDE: 9 INJECTION, SOLUTION INTRAVENOUS at 17:16

## 2022-09-11 RX ADMIN — ACETAMINOPHEN 650 MG: 650 SUPPOSITORY RECTAL at 11:32

## 2022-09-11 RX ADMIN — DONEPEZIL HYDROCHLORIDE 10 MG: 10 TABLET ORAL at 17:12

## 2022-09-11 RX ADMIN — CEFEPIME 2000 MG: 2 INJECTION, POWDER, FOR SOLUTION INTRAVENOUS at 00:42

## 2022-09-11 RX ADMIN — DEXTROSE AND SODIUM CHLORIDE: 5; 900 INJECTION, SOLUTION INTRAVENOUS at 08:54

## 2022-09-11 RX ADMIN — SODIUM CHLORIDE, PRESERVATIVE FREE 2 ML: 5 INJECTION INTRAVENOUS at 08:57

## 2022-09-11 RX ADMIN — ENOXAPARIN SODIUM 40 MG: 40 INJECTION SUBCUTANEOUS at 08:56

## 2022-09-11 SDOH — EDUCATIONAL SECURITY: EDUCATION ATTAINMENT: WHAT IS THE HIGHEST LEVEL OF SCHOOL YOU HAVE COMPLETED OR THE HIGHEST DEGREE YOU HAVE RECEIVED?: TRADE

## 2022-09-11 ASSESSMENT — COGNITIVE AND FUNCTIONAL STATUS - GENERAL
BASIC_MOBILITY_CONVERTED_SCORE: 25.80
DAILY_ACTIVITY_CONVERTED_SCORE: 20.13
HELP NEEDED FOR BATHING: TOTAL
HELP NEEDED FOR TOILETING: TOTAL
BASIC_MOBILITY_RAW_SCORE: 9
HELP NEEDED DRESSING REGULAR UPPER BODY CLOTHING: TOTAL
DAILY_ACTIVITY_RAW_SCORE: 7
HELP NEEDED DRESSING REGULAR LOWER BODY CLOTHING: TOTAL
HELP NEEDED FOR PERSONAL GROOMING: A LOT

## 2022-09-11 ASSESSMENT — PAIN SCALES - PAIN ASSESSMENT IN ADVANCED DEMENTIA (PAINAD)
FACIALEXPRESSION: SMILING OR INEXPRESSIVE
BREATHING: NORMAL
TOTALSCORE: 0
BODYLANGUAGE: RELAXED
TOTALSCORE: 0
BODYLANGUAGE: RELAXED
CONSOLABILITY: NO NEED TO CONSOLE
BREATHING: NORMAL
FACIALEXPRESSION: SMILING OR INEXPRESSIVE
CONSOLABILITY: NO NEED TO CONSOLE

## 2022-09-11 ASSESSMENT — PAIN SCALES - GENERAL
PAINLEVEL_OUTOF10: 0

## 2022-09-11 ASSESSMENT — ACTIVITIES OF DAILY LIVING (ADL): HOME_MANAGEMENT_TIME_ENTRY: 10

## 2022-09-11 ASSESSMENT — ENCOUNTER SYMPTOMS: PAIN SEVERITY NOW: 8

## 2022-09-12 ENCOUNTER — CASE MANAGEMENT (OUTPATIENT)
Dept: CARE COORDINATION | Age: 87
End: 2022-09-12

## 2022-09-12 PROBLEM — R78.81 MRSA BACTEREMIA: Status: ACTIVE | Noted: 2022-09-12

## 2022-09-12 PROBLEM — B95.62 MRSA BACTEREMIA: Status: ACTIVE | Noted: 2022-09-12

## 2022-09-12 PROBLEM — I48.91 ATRIAL FIBRILLATION WITH RVR (CMD): Status: ACTIVE | Noted: 2022-09-12

## 2022-09-12 PROBLEM — J96.01 ACUTE HYPOXEMIC RESPIRATORY FAILURE (CMD): Status: ACTIVE | Noted: 2022-09-12

## 2022-09-12 LAB
ANION GAP SERPL CALC-SCNC: 9 MMOL/L (ref 7–19)
BACTERIA BLD CULT: ABNORMAL
BACTERIA BLD CULT: ABNORMAL
BUN SERPL-MCNC: 50 MG/DL (ref 6–20)
BUN/CREAT SERPL: 54 (ref 7–25)
CALCIUM SERPL-MCNC: 8.3 MG/DL (ref 8.4–10.2)
CHLORIDE SERPL-SCNC: 119 MMOL/L (ref 97–110)
CO2 SERPL-SCNC: 20 MMOL/L (ref 21–32)
CREAT SERPL-MCNC: 0.93 MG/DL (ref 0.67–1.17)
DEPRECATED RDW RBC: 61.3 FL (ref 39–50)
ERYTHROCYTE [DISTWIDTH] IN BLOOD: 17 % (ref 11–15)
FASTING DURATION TIME PATIENT: ABNORMAL H
GFR SERPLBLD BASED ON 1.73 SQ M-ARVRAT: 78 ML/MIN
GLUCOSE SERPL-MCNC: 130 MG/DL (ref 70–99)
GRAM STN SPEC: ABNORMAL
GRAM STN SPEC: ABNORMAL
HCT VFR BLD CALC: 39.2 % (ref 39–51)
HGB BLD-MCNC: 12.7 G/DL (ref 13–17)
MCH RBC QN AUTO: 31.8 PG (ref 26–34)
MCHC RBC AUTO-ENTMCNC: 32.4 G/DL (ref 32–36.5)
MCV RBC AUTO: 98.2 FL (ref 78–100)
NRBC BLD MANUAL-RTO: 0 /100 WBC
PLATELET # BLD AUTO: 86 K/MCL (ref 140–450)
POTASSIUM SERPL-SCNC: 4.2 MMOL/L (ref 3.4–5.1)
RBC # BLD: 3.99 MIL/MCL (ref 4.5–5.9)
SODIUM SERPL-SCNC: 144 MMOL/L (ref 135–145)
WBC # BLD: 21 K/MCL (ref 4.2–11)

## 2022-09-12 PROCEDURE — 10004651 HB RX, NO CHARGE ITEM: Performed by: FAMILY MEDICINE

## 2022-09-12 PROCEDURE — 10002803 HB RX 637: Performed by: INTERNAL MEDICINE

## 2022-09-12 PROCEDURE — 10002807 HB RX 258: Performed by: FAMILY MEDICINE

## 2022-09-12 PROCEDURE — 10002803 HB RX 637: Performed by: FAMILY MEDICINE

## 2022-09-12 PROCEDURE — 87040 BLOOD CULTURE FOR BACTERIA: CPT | Performed by: INTERNAL MEDICINE

## 2022-09-12 PROCEDURE — 99233 SBSQ HOSP IP/OBS HIGH 50: CPT | Performed by: INTERNAL MEDICINE

## 2022-09-12 PROCEDURE — 80048 BASIC METABOLIC PNL TOTAL CA: CPT | Performed by: FAMILY MEDICINE

## 2022-09-12 PROCEDURE — 10002800 HB RX 250 W HCPCS: Performed by: FAMILY MEDICINE

## 2022-09-12 PROCEDURE — 36415 COLL VENOUS BLD VENIPUNCTURE: CPT | Performed by: FAMILY MEDICINE

## 2022-09-12 PROCEDURE — 10002807 HB RX 258: Performed by: INTERNAL MEDICINE

## 2022-09-12 PROCEDURE — 10002800 HB RX 250 W HCPCS: Performed by: INTERNAL MEDICINE

## 2022-09-12 PROCEDURE — 13003289 HB OXYGEN THERAPY DAILY

## 2022-09-12 PROCEDURE — 85027 COMPLETE CBC AUTOMATED: CPT | Performed by: FAMILY MEDICINE

## 2022-09-12 PROCEDURE — 87077 CULTURE AEROBIC IDENTIFY: CPT | Performed by: INTERNAL MEDICINE

## 2022-09-12 PROCEDURE — 10003585 HB ROOM CHARGE INTERMEDIATE CARE

## 2022-09-12 RX ADMIN — ZINC SULFATE 220 MG (50 MG) CAPSULE 220 MG: CAPSULE at 09:28

## 2022-09-12 RX ADMIN — CEFEPIME 2000 MG: 2 INJECTION, POWDER, FOR SOLUTION INTRAVENOUS at 02:26

## 2022-09-12 RX ADMIN — SODIUM CHLORIDE, PRESERVATIVE FREE 2 ML: 5 INJECTION INTRAVENOUS at 20:37

## 2022-09-12 RX ADMIN — SODIUM CHLORIDE, PRESERVATIVE FREE 2 ML: 5 INJECTION INTRAVENOUS at 09:27

## 2022-09-12 RX ADMIN — DONEPEZIL HYDROCHLORIDE 10 MG: 10 TABLET ORAL at 19:35

## 2022-09-12 RX ADMIN — SODIUM CHLORIDE: 9 INJECTION, SOLUTION INTRAVENOUS at 02:34

## 2022-09-12 RX ADMIN — VANCOMYCIN HYDROCHLORIDE 1000 MG: 1 INJECTION, POWDER, LYOPHILIZED, FOR SOLUTION INTRAVENOUS at 00:19

## 2022-09-12 RX ADMIN — POTASSIUM CHLORIDE 40 MEQ: 1500 TABLET, EXTENDED RELEASE ORAL at 02:22

## 2022-09-12 RX ADMIN — METOPROLOL TARTRATE 25 MG: 25 TABLET, FILM COATED ORAL at 20:36

## 2022-09-12 RX ADMIN — MEMANTINE HYDROCHLORIDE 10 MG: 10 TABLET, FILM COATED ORAL at 20:36

## 2022-09-12 RX ADMIN — VANCOMYCIN HYDROCHLORIDE 1000 MG: 1 INJECTION, POWDER, LYOPHILIZED, FOR SOLUTION INTRAVENOUS at 20:36

## 2022-09-12 RX ADMIN — MEMANTINE HYDROCHLORIDE 10 MG: 10 TABLET, FILM COATED ORAL at 09:28

## 2022-09-12 RX ADMIN — ENOXAPARIN SODIUM 40 MG: 40 INJECTION SUBCUTANEOUS at 09:27

## 2022-09-12 RX ADMIN — Medication 50 MCG: at 09:28

## 2022-09-12 ASSESSMENT — PAIN SCALES - PAIN ASSESSMENT IN ADVANCED DEMENTIA (PAINAD)
CONSOLABILITY: NO NEED TO CONSOLE
BODYLANGUAGE: RELAXED
BREATHING: NORMAL
TOTALSCORE: 1
FACIALEXPRESSION: SMILING OR INEXPRESSIVE
NEGVOCALIZATION: OCCASIONAL MOAN OR GROAN, LOW LEVELS OF SPEECH WITH A NEGATIVE OR DISAPPROVING QUALITY

## 2022-09-12 ASSESSMENT — PAIN SCALES - GENERAL
PAINLEVEL_OUTOF10: 0

## 2022-09-13 VITALS
SYSTOLIC BLOOD PRESSURE: 128 MMHG | HEIGHT: 72 IN | BODY MASS INDEX: 25.89 KG/M2 | TEMPERATURE: 98.6 F | RESPIRATION RATE: 16 BRPM | WEIGHT: 191.14 LBS | OXYGEN SATURATION: 94 % | HEART RATE: 104 BPM | DIASTOLIC BLOOD PRESSURE: 64 MMHG

## 2022-09-13 LAB
BACTERIA BLD CULT: ABNORMAL
BACTERIA BLD CULT: ABNORMAL
GRAM STN SPEC: ABNORMAL
GRAM STN SPEC: ABNORMAL

## 2022-09-13 PROCEDURE — 13003289 HB OXYGEN THERAPY DAILY

## 2022-09-13 PROCEDURE — 10004180 HB COUNTER-TRANSPORT

## 2022-09-13 PROCEDURE — 10002803 HB RX 637: Performed by: FAMILY MEDICINE

## 2022-09-13 PROCEDURE — 10004651 HB RX, NO CHARGE ITEM: Performed by: FAMILY MEDICINE

## 2022-09-13 PROCEDURE — 10002803 HB RX 637: Performed by: INTERNAL MEDICINE

## 2022-09-13 PROCEDURE — 99239 HOSP IP/OBS DSCHRG MGMT >30: CPT | Performed by: INTERNAL MEDICINE

## 2022-09-13 RX ADMIN — MEMANTINE HYDROCHLORIDE 10 MG: 10 TABLET, FILM COATED ORAL at 10:11

## 2022-09-13 RX ADMIN — Medication 50 MCG: at 10:12

## 2022-09-13 RX ADMIN — ZINC SULFATE 220 MG (50 MG) CAPSULE 220 MG: CAPSULE at 10:11

## 2022-09-13 RX ADMIN — METOPROLOL TARTRATE 25 MG: 25 TABLET, FILM COATED ORAL at 10:11

## 2022-09-13 RX ADMIN — SODIUM CHLORIDE, PRESERVATIVE FREE 2 ML: 5 INJECTION INTRAVENOUS at 10:17

## 2022-09-13 ASSESSMENT — PAIN SCALES - PAIN ASSESSMENT IN ADVANCED DEMENTIA (PAINAD)
CONSOLABILITY: NO NEED TO CONSOLE
BREATHING: NORMAL
BODYLANGUAGE: RELAXED
TOTALSCORE: 0
FACIALEXPRESSION: SMILING OR INEXPRESSIVE

## 2022-09-14 ENCOUNTER — CASE MANAGEMENT (OUTPATIENT)
Dept: CARE COORDINATION | Age: 87
End: 2022-09-14

## 2022-09-14 LAB
BACTERIA BLD CULT: ABNORMAL
GRAM STN SPEC: ABNORMAL

## 2022-09-15 LAB
BACTERIA BLD CULT: ABNORMAL
GRAM STN SPEC: ABNORMAL

## 2022-12-13 ENCOUNTER — CASE MANAGEMENT (OUTPATIENT)
Dept: CARE COORDINATION | Age: 87
End: 2022-12-13

## (undated) NOTE — IP AVS SNAPSHOT
[facility]            (For Outpatient Use Only) Initial Admit Date: 2022   Inpt/Obs Admit Date: Inpt: N/A / Obs: 22   Discharge Date:    Hospital Acct:  [de-identified]   MRN: [de-identified]   CSN: [id number]   CEID: [id number]        ENCOUNTER  Patient Class: Observation Admitting Provider: Herminia Holbrook MD Unit: [address] [place], [place] [address]W/SE   Hospital Service: Medical Attending Provider: Cristy Cain MD   Bed: 545-A   Visit Type:   Referring Physician: No ref. provider found Billing Flag:    Admit Diagnosis: Inability to ambulate due to multiple joints [R26.2]      PATIENT  Legal Name:   Jayden Craig   Legal Sex: Male  Gender ID:              Pref Name:    PCP:  Saqib Arenas MD Home: [phone]   Address:  [address] : 1932 (89 yrs) Mobile: [phone]         City/[facility]/Zip: [facility] Marital:  Language: [address] [place] [address] Drive: trueAnthem SSN4: [id number] Buddhism: Daryl Liming Not Stephane Shove*     Race: White Ethnicity: Non  Or [address]   Name Relationship Legal Guardian? Home Phone Work Phone Mobile Phone   1.  Bernadette Stewart  2. *No Contact Specified* Daughter      [phone]       GUARANTOR  Guarantor: Ric Granger : 1932 Home Phone: [phone]   Address: [facility] [address]   Sex: Male Work Phone: [phone]   City/State/Zip: [address]   Rel. to Patient: Self Guarantor ID: [id number]   [address]   Employer: [employer] Status: SELF EMPL*     COVERAGE  PRIMARY INSURANCE   Payor: MEDICARE Plan: MEDICARE PART A&B   Group Number:  Insurance Type: [id number] Name: Lisa Mason : 1932   Subscriber ID: [id number] Pt Rel to Subscriber: Self   SECONDARY INSURANCE   Payor: AARP Plan: AARP   Group Number: [id number] Insurance Type: INDEMNITY   Subscriber Name: Lisa Mason : 1932   Subscriber ID: [id number] Pt Rel to Subscriber: SELF   TERTIARY INSURANCE   Payor:  Plan:    Group Number:  Insurance Type:    Subscriber Name:  Subscriber :    Subscriber ID:  Pt Rel to Subscriber:    Hospital Account Financial Class: Medicare    2022